# Patient Record
Sex: FEMALE | Race: BLACK OR AFRICAN AMERICAN | Employment: FULL TIME | URBAN - METROPOLITAN AREA
[De-identification: names, ages, dates, MRNs, and addresses within clinical notes are randomized per-mention and may not be internally consistent; named-entity substitution may affect disease eponyms.]

---

## 2017-02-03 ENCOUNTER — TELEPHONE (OUTPATIENT)
Dept: FAMILY MEDICINE CLINIC | Facility: CLINIC | Age: 57
End: 2017-02-03

## 2017-02-03 NOTE — TELEPHONE ENCOUNTER
----- Message from Terrie Godoy sent at 2/3/2017 11:27 AM EST -----  Contact: DR KEYS  PATIENT IS GOING TO BE HAVING SOME PAPER WORK SENT TO YOU TO FILL OUT FOR HER . PATIENT WANTED YOU TO KNOW THAT SHE HAS BEEN AT [facility] AT 3 DIFFERENT TIMES. ALSO PATIENT WANTS TO KNOW IF YOU CAN RENEW THAT PAPER WORK FOR A WHOLE YEAR INSTEAD OF 6 MONTHS?  [phone]

## 2017-02-09 ENCOUNTER — TELEPHONE (OUTPATIENT)
Dept: FAMILY MEDICINE CLINIC | Facility: CLINIC | Age: 57
End: 2017-02-09

## 2017-02-09 NOTE — TELEPHONE ENCOUNTER
I haven't seen it yet. I will need her records from when she was hospitalized and she will need to make an appointment to discuss why she is off from work, documentation purposes. I haven't seen her since Nov 2016. RUDDY

## 2017-02-09 NOTE — TELEPHONE ENCOUNTER
----- Message from Angie Sims sent at 2/9/2017 10:47 AM EST -----  Contact: Kaylyn  Patient is calling to see if leave of absence paperwork has been received. Please call patient [phone].

## 2017-02-09 NOTE — TELEPHONE ENCOUNTER
When I called pt to tell her, she say's, the first message from 2-3-17 should have been to Dr. Heath, not Dr. Kincaid. She will call and have the papers faxed to Dr. Heath and if she needs an appt let her know.  Dr. Heath had completed these papers for her in the past.

## 2017-03-08 DIAGNOSIS — R51.9 ACUTE NONINTRACTABLE HEADACHE, UNSPECIFIED HEADACHE TYPE: ICD-10-CM

## 2017-03-08 DIAGNOSIS — M79.671 PAIN OF RIGHT HEEL: ICD-10-CM

## 2017-03-08 DIAGNOSIS — M79.601 RIGHT ARM PAIN: ICD-10-CM

## 2017-03-10 RX ORDER — NAPROXEN 500 MG/1
TABLET ORAL
Qty: 40 TABLET | Refills: 0 | OUTPATIENT
Start: 2017-03-10

## 2017-03-15 DIAGNOSIS — M79.601 RIGHT ARM PAIN: ICD-10-CM

## 2017-03-15 DIAGNOSIS — R51.9 ACUTE NONINTRACTABLE HEADACHE, UNSPECIFIED HEADACHE TYPE: ICD-10-CM

## 2017-03-15 DIAGNOSIS — M79.671 PAIN OF RIGHT HEEL: ICD-10-CM

## 2017-03-16 NOTE — TELEPHONE ENCOUNTER
Before we can refill this, need to confirm with her which one she is taking < Meloxicam or naproxen. Let pharmacy know what the issue is and we have to wait til we hear form her. bds

## 2017-03-17 RX ORDER — NAPROXEN 500 MG/1
TABLET ORAL
Qty: 40 TABLET | Refills: 0 | Status: SHIPPED | OUTPATIENT
Start: 2017-03-17 | End: 2017-03-24 | Stop reason: SDUPTHER

## 2017-03-24 ENCOUNTER — OFFICE VISIT (OUTPATIENT)
Dept: FAMILY MEDICINE CLINIC | Facility: CLINIC | Age: 57
End: 2017-03-24

## 2017-03-24 VITALS
HEIGHT: 63 IN | HEART RATE: 80 BPM | DIASTOLIC BLOOD PRESSURE: 85 MMHG | TEMPERATURE: 97.8 F | RESPIRATION RATE: 16 BRPM | SYSTOLIC BLOOD PRESSURE: 130 MMHG | BODY MASS INDEX: 32 KG/M2 | WEIGHT: 180.6 LBS

## 2017-03-24 DIAGNOSIS — R73.9 HYPERGLYCEMIA: ICD-10-CM

## 2017-03-24 DIAGNOSIS — M79.601 PARESTHESIA AND PAIN OF BOTH UPPER EXTREMITIES: Primary | ICD-10-CM

## 2017-03-24 DIAGNOSIS — Z13.220 SCREENING FOR HYPERLIPIDEMIA: ICD-10-CM

## 2017-03-24 DIAGNOSIS — R25.2 MUSCLE CRAMPS: ICD-10-CM

## 2017-03-24 DIAGNOSIS — M79.602 PARESTHESIA AND PAIN OF BOTH UPPER EXTREMITIES: Primary | ICD-10-CM

## 2017-03-24 DIAGNOSIS — R20.2 PARESTHESIA AND PAIN OF BOTH UPPER EXTREMITIES: Primary | ICD-10-CM

## 2017-03-24 PROCEDURE — 99213 OFFICE O/P EST LOW 20 MIN: CPT | Performed by: FAMILY MEDICINE

## 2017-03-24 RX ORDER — GABAPENTIN 600 MG/1
600 TABLET ORAL 3 TIMES DAILY
Qty: 90 TABLET | Refills: 2 | Status: SHIPPED | OUTPATIENT
Start: 2017-03-24 | End: 2019-11-01

## 2017-03-24 RX ORDER — NAPROXEN 500 MG/1
500 TABLET ORAL 2 TIMES DAILY WITH MEALS
Qty: 60 TABLET | Refills: 2 | Status: SHIPPED | OUTPATIENT
Start: 2017-03-24 | End: 2017-07-25

## 2017-03-24 RX ORDER — METHOCARBAMOL 500 MG/1
500 TABLET, FILM COATED ORAL 3 TIMES DAILY PRN
Qty: 30 TABLET | Refills: 2 | Status: SHIPPED | OUTPATIENT
Start: 2017-03-24 | End: 2017-07-25

## 2017-03-24 NOTE — PROGRESS NOTES
Subjective   Irris MARIA DE JESUS Tony is a 56 y.o. female.     History of Present Illness   Here for evaluation of bilateral arm numbness and tingling that radiates into her hands  This is a chronic condition and treated with Neurontin. She feels that she needs an increase in the dosage because it doesn't completely resolve symptoms.   She denies that it starts in her neck region, no history of neck injury  Dr. Heath has seen her for this condition. In August, he encouraged her to complete a nerve conduction study to evaluate the symptoms. I asked her about this today, she states that she is scared of needles.   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 While working, her symptoms do worsen at times.   Has weakness in her hands and forearms bilaterally  Symptoms are worse on the right side    Labs are due    The following portions of the patient's history were reviewed and updated as appropriate: allergies, current medications, past family history, past medical history, past social history, past surgical history and problem list.    Review of Systems   Respiratory: Negative for cough and shortness of breath.    Cardiovascular: Negative for chest pain.   Endocrine: Negative for cold intolerance and heat intolerance.   Musculoskeletal: Negative for neck pain and neck stiffness.   Allergic/Immunologic: Negative for environmental allergies.   Neurological: Positive for weakness and numbness. Negative for dizziness and headaches.   Hematological: Negative for adenopathy. Does not bruise/bleed easily.   Psychiatric/Behavioral: Negative for confusion and sleep disturbance.       Objective   Physical Exam   Constitutional: She is oriented to person, place, and time. She appears well-developed and well-nourished.   HENT:   Head: Normocephalic and atraumatic.   Right Ear: External ear normal.   Left Ear: External ear normal.   Nose: Nose normal.   Eyes: Conjunctivae and EOM are normal.   Cardiovascular:  Normal rate, regular rhythm and normal heart sounds.    Pulmonary/Chest: Effort normal and breath sounds normal. No respiratory distress. She has no wheezes.   Musculoskeletal: She exhibits no edema or deformity.   Neurological: She is alert and oriented to person, place, and time. A sensory deficit (can detect normal touch, numbness and tingling of fingertips ) is present. No cranial nerve deficit.   Skin: Skin is warm and dry.   Psychiatric: She has a normal mood and affect. Her behavior is normal.   Nursing note and vitals reviewed.      Assessment/Plan   Drake was seen today for pain & tingling in bilateral arms & hands.    Diagnoses and all orders for this visit:    Paresthesia and pain of both upper extremities  -     gabapentin (NEURONTIN) 600 MG tablet; Take 1 tablet by mouth 3 (Three) Times a Day.  -     naproxen (NAPROSYN) 500 MG tablet; Take 1 tablet by mouth 2 (Two) Times a Day With Meals.  -     EMG & Nerve Conduction Test  -     Comprehensive Metabolic Panel; Future  -     CBC & Differential; Future  -     Vitamin B12; Future    Muscle cramps  -     methocarbamol (ROBAXIN) 500 MG tablet; Take 1 tablet by mouth 3 (Three) Times a Day As Needed for Muscle Spasms.  -     Comprehensive Metabolic Panel; Future  -     CBC & Differential; Future  -     Magnesium; Future    Hyperglycemia  -     Comprehensive Metabolic Panel; Future  -     CBC & Differential; Future  -     Hemoglobin A1c; Future    Screening for hyperlipidemia  -     Comprehensive Metabolic Panel; Future  -     CBC & Differential; Future  -     Lipid Panel; Future      Increased Neurontin to 600mg TID   I have encouraged her to complete nerve conduction study of UE b/l  Instructed her to complete labs soon

## 2017-03-24 NOTE — PATIENT INSTRUCTIONS
Go to the nearest ER or return to clinic if symptoms worsen, fever/chill develop    Neuropathic Pain  Neuropathic pain is pain caused by damage to the nerves that are responsible for certain sensations in your body (sensory nerves). The pain can be caused by damage to:   · The sensory nerves that send signals to your spinal cord and brain (peripheral nervous system).  · The sensory nerves in your brain or spinal cord (central nervous system).  Neuropathic pain can make you more sensitive to pain. What would be a minor sensation for most people may feel very painful if you have neuropathic pain. This is usually a long-term condition that can be difficult to treat. The type of pain can differ from person to person. It may start suddenly (acute), or it may develop slowly and last for a long time (chronic). Neuropathic pain may come and go as damaged nerves heal or may stay at the same level for years. It often causes emotional distress, loss of sleep, and a lower quality of life.  CAUSES   The most common cause of damage to a sensory nerve is diabetes. Many other diseases and conditions can also cause neuropathic pain. Causes of neuropathic pain can be classified as:  · Toxic. Many drugs and chemicals can cause toxic damage. The most common cause of toxic neuropathic pain is damage from drug treatment for cancer (chemotherapy).  · Metabolic. This type of pain can happen when a disease causes imbalances that damage nerves. Diabetes is the most common of these diseases. Vitamin B deficiency caused by long-term alcohol abuse is another common cause.  · Traumatic. Any injury that cuts, crushes, or stretches a nerve can cause damage and pain. A common example is feeling pain after losing an arm or leg (phantom limb pain).  · Compression-related. If a sensory nerve gets trapped or compressed for a long period of time, the blood supply to the nerve can be cut off.  · Vascular. Many blood vessel diseases can cause neuropathic  pain by decreasing blood supply and oxygen to nerves.  · Autoimmune. This type of pain results from diseases in which the body's defense system mistakenly attacks sensory nerves. Examples of autoimmune diseases that can cause neuropathic pain include lupus and multiple sclerosis.  · Infectious. Many types of viral infections can damage sensory nerves and cause pain. Shingles infection is a common cause of this type of pain.  · Inherited. Neuropathic pain can be a symptom of many diseases that are passed down through families (genetic).  SIGNS AND SYMPTOMS   The main symptom is pain. Neuropathic pain is often described as:  · Burning.  · Shock-like.  · Stinging.  · Hot or cold.  · Itching.  DIAGNOSIS   No single test can diagnose neuropathic pain. Your health care provider will do a physical exam and ask you about your pain. You may use a pain scale to describe how bad your pain is. You may also have tests to see if you have a high sensitivity to pain and to help find the cause and location of any sensory nerve damage. These tests may include:  · Imaging studies, such as:    X-rays.    CT scan.    MRI.  · Nerve conduction studies to test how well nerve signals travel through your sensory nerves (electrodiagnostic testing).  · Stimulating your sensory nerves through electrodes on your skin and measuring the response in your spinal cord and brain (somatosensory evoked potentials).  TREATMENT   Treatment for neuropathic pain may change over time. You may need to try different treatment options or a combination of treatments. Some options include:  · Over-the-counter pain relievers.  · Prescription medicines. Some medicines used to treat other conditions may also help neuropathic pain. These include medicines to:  ¨ Control seizures (anticonvulsants).  ¨ Relieve depression (antidepressants).  · Prescription-strength pain relievers (narcotics). These are usually used when other pain relievers do not  help.  · Transcutaneous nerve stimulation (TENS). This uses electrical currents to block painful nerve signals. The treatment is painless.  · Topical and local anesthetics. These are medicines that numb the nerves. They can be injected as a nerve block or applied to the skin.  · Alternative treatments, such as:  ¨ Acupuncture.  ¨ Meditation.  ¨ Massage.  ¨ Physical therapy.  ¨ Pain management programs.  ¨ Counseling.  HOME CARE INSTRUCTIONS  · Learn as much as you can about your condition.  · Take medicines only as directed by your health care provider.  · Work closely with all your health care providers to find what works best for you.  · Have a good support system at home.  · Consider joining a chronic pain support group.  SEEK MEDICAL CARE IF:  · Your pain treatments are not helping.  · You are having side effects from your medicines.  · You are struggling with fatigue, mood changes, depression, or anxiety.     This information is not intended to replace advice given to you by your health care provider. Make sure you discuss any questions you have with your health care provider.     Document Released: 09/14/2005 Document Revised: 01/08/2016 Document Reviewed: 05/28/2015  [employer] Patient Education ©2016 [employer]

## 2017-07-14 ENCOUNTER — TELEPHONE (OUTPATIENT)
Dept: FAMILY MEDICINE CLINIC | Facility: CLINIC | Age: 57
End: 2017-07-14

## 2017-07-14 RX ORDER — FLUCONAZOLE 150 MG/1
TABLET ORAL
Qty: 2 TABLET | Refills: 0 | Status: SHIPPED | OUTPATIENT
Start: 2017-07-14 | End: 2017-07-25

## 2017-07-14 NOTE — TELEPHONE ENCOUNTER
Please call.  I will send in the prescription for Diflucan but if not getting better, will need office visit.

## 2017-07-14 NOTE — TELEPHONE ENCOUNTER
"Spoke with pt who c/o a \"fishy odor\", \"creamy\" vaginal D/C, burning with urination, and pelvic pain x2 days. Denies back pain, fever/chills, being on an ATB recently. Please advise.  "

## 2017-07-25 ENCOUNTER — OFFICE VISIT (OUTPATIENT)
Dept: FAMILY MEDICINE CLINIC | Facility: CLINIC | Age: 57
End: 2017-07-25

## 2017-07-25 VITALS
TEMPERATURE: 98 F | OXYGEN SATURATION: 98 % | DIASTOLIC BLOOD PRESSURE: 88 MMHG | HEART RATE: 110 BPM | RESPIRATION RATE: 16 BRPM | BODY MASS INDEX: 31.45 KG/M2 | SYSTOLIC BLOOD PRESSURE: 126 MMHG | WEIGHT: 177.5 LBS | HEIGHT: 63 IN

## 2017-07-25 DIAGNOSIS — M54.41 ACUTE RIGHT-SIDED LOW BACK PAIN WITH RIGHT-SIDED SCIATICA: Primary | ICD-10-CM

## 2017-07-25 PROCEDURE — 99213 OFFICE O/P EST LOW 20 MIN: CPT | Performed by: FAMILY MEDICINE

## 2017-07-25 RX ORDER — DICLOFENAC SODIUM 75 MG/1
75 TABLET, DELAYED RELEASE ORAL 2 TIMES DAILY
Qty: 30 TABLET | Refills: 1 | Status: SHIPPED | OUTPATIENT
Start: 2017-07-25 | End: 2017-12-11

## 2017-07-25 NOTE — PROGRESS NOTES
"  Chief Complaint   Patient presents with   • Pain     back and hip pain       Subjective     Back Pain   This is a recurrent problem. The current episode started more than 1 year ago. The problem occurs daily. The problem has been gradually worsening (worse x 1 week) since onset. The pain is present in the lumbar spine. The quality of the pain is described as aching. The pain radiates to the left thigh. The pain is moderate. The symptoms are aggravated by standing (walking). Associated symptoms include leg pain and weakness (at times). Pertinent negatives include no bladder incontinence, bowel incontinence, numbness, paresis or tingling. She has tried NSAIDs (naproxen) for the symptoms. The treatment provided no relief.       Schizophrenia  Prior hospitalization for schizophrenia  still see faces and hear voice s and paranoid  Will need new FMLA and due 8/25/2017  Has been hospital 3 times for schizophrenia 2014  [employer] MD leaving. She is going to try to Dr Moore      Past Medical History,Medications, Allergies, and social history was reviewed.    Review of Systems   Gastrointestinal: Negative for bowel incontinence.   Genitourinary: Negative for bladder incontinence.   Musculoskeletal: Positive for back pain.   Neurological: Positive for weakness (at times). Negative for tingling and numbness.       Objective     Vitals:    07/25/17 1233   BP: 126/88   Pulse: 110   Resp: 16   Temp: 98 °F (36.7 °C)   TempSrc: Temporal Artery    SpO2: 98%   Weight: 177 lb 8 oz (80.5 kg)   Height: 63\" (160 cm)        Physical Exam   Constitutional: She is oriented to person, place, and time. She appears well-developed and well-nourished.   HENT:   Head: Normocephalic and atraumatic.   Right Ear: Hearing and external ear normal.   Left Ear: Hearing and external ear normal.   Mouth/Throat: Oropharynx is clear and moist.   Eyes: Conjunctivae and EOM are normal. Pupils are equal, round, and reactive to light.   Cardiovascular: Exam " reveals no friction rub.    No murmur heard.  Pulmonary/Chest: Effort normal.   Musculoskeletal:        Lumbar back: She exhibits decreased range of motion and tenderness.   SLR +/- left leg     Neurological: She is alert and oriented to person, place, and time.   Reflex Scores:       Patellar reflexes are 1+ on the right side and 1+ on the left side.  Skin: Skin is warm.   Psychiatric: She has a normal mood and affect. Her behavior is normal.   Nursing note and vitals reviewed.        Assessment/Plan     Problem List Items Addressed This Visit     None      Visit Diagnoses     Acute right-sided low back pain with right-sided sciatica    -  Primary    Relevant Medications    diclofenac (VOLTAREN) 75 MG EC tablet           Follow up: Return if symptoms worsen or fail to improve.       DISCUSSION  Discuss options for treatment.  I suspect she has a irritated nerve or sciatica.  Considered prednisone but she does have history of schizophrenia with hallucinations that this could make that worse.  Since naproxen not helping completely, we will try Voltaren as noted.  Discussed options including physical therapy as well but she does not think she will have time for that.  We will see how this works and she will call if not improving.    Near the end of August, she will need new [facility] paperwork for schizophrenia.  She will follow-up with psychiatry.  She is trying to get in to see Dr. Moore given that [facility] physician is leaving.  We may need to give her medication if she is not able to get in until she can see a psychiatrist.  Would not want her to go without her medication.    MEDICATIONS PRESCRIBED  Requested Prescriptions     Signed Prescriptions Disp Refills   • diclofenac (VOLTAREN) 75 MG EC tablet 30 tablet 1     Sig: Take 1 tablet by mouth 2 (Two) Times a Day.          Wade Heath MD

## 2017-09-01 ENCOUNTER — TELEPHONE (OUTPATIENT)
Dept: FAMILY MEDICINE CLINIC | Facility: CLINIC | Age: 57
End: 2017-09-01

## 2017-09-01 RX ORDER — FLUCONAZOLE 150 MG/1
TABLET ORAL
Qty: 2 TABLET | Refills: 0 | Status: SHIPPED | OUTPATIENT
Start: 2017-09-01 | End: 2017-12-11

## 2017-09-01 NOTE — TELEPHONE ENCOUNTER
----- Message from Mei Royal sent at 9/1/2017  8:35 AM EDT -----  Contact: butler  PATIENT TRIED A NEW SOAP AND HER YEAST INFECTION HAS COME BACK. ASKING IF YOU COULD PLEASE CALL IN A COUPLE MORE OF THE DIFLUCAN      [place]OMETOWN IN [facility]

## 2017-12-11 ENCOUNTER — OFFICE VISIT (OUTPATIENT)
Dept: FAMILY MEDICINE CLINIC | Facility: CLINIC | Age: 57
End: 2017-12-11

## 2017-12-11 VITALS
WEIGHT: 180 LBS | DIASTOLIC BLOOD PRESSURE: 90 MMHG | SYSTOLIC BLOOD PRESSURE: 126 MMHG | HEIGHT: 63 IN | OXYGEN SATURATION: 99 % | TEMPERATURE: 98 F | RESPIRATION RATE: 14 BRPM | BODY MASS INDEX: 31.89 KG/M2 | HEART RATE: 100 BPM

## 2017-12-11 DIAGNOSIS — F20.0 PARANOID SCHIZOPHRENIA (HCC): Primary | ICD-10-CM

## 2017-12-11 DIAGNOSIS — R20.2 NUMBNESS AND TINGLING IN BOTH HANDS: ICD-10-CM

## 2017-12-11 DIAGNOSIS — R20.0 NUMBNESS AND TINGLING IN BOTH HANDS: ICD-10-CM

## 2017-12-11 PROCEDURE — 99213 OFFICE O/P EST LOW 20 MIN: CPT | Performed by: FAMILY MEDICINE

## 2017-12-11 RX ORDER — NAPROXEN 500 MG/1
500 TABLET ORAL 2 TIMES DAILY WITH MEALS
COMMUNITY
End: 2017-12-11 | Stop reason: SDUPTHER

## 2017-12-11 RX ORDER — NAPROXEN 500 MG/1
500 TABLET ORAL 2 TIMES DAILY WITH MEALS
Qty: 40 TABLET | Refills: 2 | Status: SHIPPED | OUTPATIENT
Start: 2017-12-11 | End: 2018-10-08 | Stop reason: SDUPTHER

## 2017-12-11 NOTE — PROGRESS NOTES
Assessment/Plan     Problem List Items Addressed This Visit        Other    Paranoid schizophrenia - Primary      Other Visit Diagnoses     Numbness and tingling in both hands        Possible CTS    Relevant Medications    naproxen (NAPROSYN) 500 MG tablet           Follow up: Return if symptoms worsen or fail to improve.     DISCUSSION  Paranoid schizophrenia.  Continue medication.  Completed work form accommodation sheet.  She will take to work.  See sheet.    Numbness and tingling.  Possible carpal tunnel.  Try naproxen and if not improving, will need further workup.      MEDICATIONS PRESCRIBED  Requested Prescriptions     Signed Prescriptions Disp Refills   • naproxen (NAPROSYN) 500 MG tablet 40 tablet 2     Sig: Take 1 tablet by mouth 2 (Two) Times a Day With Meals.          -------------------------------------------    Subjective     Chief Complaint   Patient presents with   • paper work needing completed   • Numbness       HPI    Paranoid schizophrenia  Patient with diagnosis of paranoid schizophrenia.  She sees a psychiatrist.  Needs accommodation works form completed for work.  They would not complete this for her.    She reports history of visual and auditory hallucinations.  At times he is getting worse and limits her ability to concentrate and work due to fear and she has to take a break from what she is doing.  Occasionally she has to leave work.  This happens at least 2 or maybe 3 times per day where she has to take a break.  She works about 6 hours per day.  It does not limit her ability to work or do physical labor while she is not having the hallucinations.  It only affects this during the time of hallucinations.    Hand numbness  Naproxen helps  Would like to get a refill of naproxen.  Numbness comes and goes.  Medication does help.        Past Medical History,Medications, Allergies, and social history was reviewed.    Review of Systems   Constitutional: Negative.    HENT: Negative.   "  Respiratory: Negative.    Cardiovascular: Negative.    Gastrointestinal: Negative.    Musculoskeletal: Negative.    Neurological: Positive for numbness.   Psychiatric/Behavioral: Positive for hallucinations. The patient is nervous/anxious.        Objective     Vitals:    12/11/17 1534 12/11/17 1607   BP: 126/90    Pulse: (!) 122 100   Resp: 14    Temp: 98 °F (36.7 °C)    TempSrc: Temporal Artery     SpO2: 99%    Weight: 81.6 kg (180 lb)    Height: 160 cm (62.99\")         Physical Exam   Constitutional: She is oriented to person, place, and time. She appears well-developed and well-nourished.   HENT:   Head: Normocephalic and atraumatic.   Right Ear: Hearing and external ear normal.   Left Ear: Hearing and external ear normal.   Mouth/Throat: Oropharynx is clear and moist.   Eyes: Conjunctivae and EOM are normal. Pupils are equal, round, and reactive to light.   Cardiovascular: Normal rate, regular rhythm and normal heart sounds.    Pulmonary/Chest: Effort normal and breath sounds normal. No respiratory distress. She has no wheezes. She has no rales.   Musculoskeletal: She exhibits no edema.   Neurological: She is alert and oriented to person, place, and time.   Skin: Skin is warm.   Psychiatric: She has a normal mood and affect. Her behavior is normal.   Nursing note and vitals reviewed.              Wade Heath MD    "

## 2018-01-09 ENCOUNTER — TELEPHONE (OUTPATIENT)
Dept: FAMILY MEDICINE CLINIC | Facility: CLINIC | Age: 58
End: 2018-01-09

## 2018-01-09 ENCOUNTER — OFFICE VISIT (OUTPATIENT)
Dept: FAMILY MEDICINE CLINIC | Facility: CLINIC | Age: 58
End: 2018-01-09

## 2018-01-09 VITALS
OXYGEN SATURATION: 100 % | RESPIRATION RATE: 14 BRPM | WEIGHT: 187 LBS | HEART RATE: 80 BPM | SYSTOLIC BLOOD PRESSURE: 124 MMHG | DIASTOLIC BLOOD PRESSURE: 86 MMHG | TEMPERATURE: 98 F | HEIGHT: 63 IN | BODY MASS INDEX: 33.13 KG/M2

## 2018-01-09 DIAGNOSIS — Z20.828 EXPOSURE TO THE FLU: ICD-10-CM

## 2018-01-09 DIAGNOSIS — N76.0 ACUTE VAGINITIS: ICD-10-CM

## 2018-01-09 DIAGNOSIS — F20.0 PARANOID SCHIZOPHRENIA (HCC): ICD-10-CM

## 2018-01-09 DIAGNOSIS — F41.9 ANXIETY: Primary | ICD-10-CM

## 2018-01-09 PROCEDURE — 99213 OFFICE O/P EST LOW 20 MIN: CPT | Performed by: FAMILY MEDICINE

## 2018-01-09 RX ORDER — OSELTAMIVIR PHOSPHATE 75 MG/1
75 CAPSULE ORAL DAILY
Qty: 10 CAPSULE | Refills: 0 | Status: SHIPPED | OUTPATIENT
Start: 2018-01-09 | End: 2018-04-05

## 2018-01-09 RX ORDER — AMITRIPTYLINE HYDROCHLORIDE 25 MG/1
25-50 TABLET, FILM COATED ORAL NIGHTLY
Qty: 30 TABLET | Refills: 1 | Status: SHIPPED | OUTPATIENT
Start: 2018-01-09 | End: 2018-02-05 | Stop reason: SDUPTHER

## 2018-01-09 RX ORDER — FLUCONAZOLE 150 MG/1
TABLET ORAL
Qty: 2 TABLET | Refills: 0 | Status: SHIPPED | OUTPATIENT
Start: 2018-01-09 | End: 2018-04-05

## 2018-01-09 NOTE — TELEPHONE ENCOUNTER
ASAP.  Please call them to verify that she missed these days:      [facility]  fax [phone]      WIN # [phone]    1960    Missed 2018 and 2018

## 2018-01-09 NOTE — PROGRESS NOTES
Assessment/Plan     Problem List Items Addressed This Visit        Other    Paranoid schizophrenia    Relevant Medications    amitriptyline (ELAVIL) 25 MG tablet      Other Visit Diagnoses     Anxiety    -  Primary    Relevant Medications    amitriptyline (ELAVIL) 25 MG tablet    Exposure to the flu        Relevant Medications    oseltamivir (TAMIFLU) 75 MG capsule    Acute vaginitis               Follow up: Return if symptoms worsen or fail to improve.     DISCUSSION  Amoxapine is a tricyclic antidepressant.  We will try amitriptyline 25 mg one at bedtime and may try to if that does not help.    The disability company was notified that she missed those days.    Diflucan for vaginal discharge consistent with yeast infection.  This is helped in the past.    Her  has the flu so we will start 75 mg a Tamiflu daily for 10 days.      MEDICATIONS PRESCRIBED  Requested Prescriptions     Signed Prescriptions Disp Refills   • amitriptyline (ELAVIL) 25 MG tablet 30 tablet 1     Sig: Take 1-2 tablets by mouth Every Night.   • fluconazole (DIFLUCAN) 150 MG tablet 2 tablet 0     Sig: one by mouth today and repeat in 3 days if not better   • oseltamivir (TAMIFLU) 75 MG capsule 10 capsule 0     Sig: Take 1 capsule by mouth Daily.          -------------------------------------------    Subjective     Chief Complaint   Patient presents with   • PAPERWORK   • Vaginitis     discharge white only no other sx.    • Anxiety     the pt has been out of meds for a month on the amoxapine due to they are out of stock or havent made it and will not get any more in until feb 24. pt is feeling crazy in her head and she has called her psych. and they said they couldn't call any thing in and she would need appt. her appt is set for jan 30 and she is wanting to see if you can help her with something until then. please discuss with pt.        HPI    Anxiety  Out of Amoxapine 50 mg 2 po at night  Out of this meds  Not able to get til  "2/24/2018  Sees psy 1/30/2018      Missed   [phone]   1960  Bernadette [id number] [phone] fax [phone]    Missed 1/6/2018 and 1/9/2018    Vaginal d.c  White, thick  No itch  Sl odor  No antibiotic  X 1 week of symptoms  Diflucan helped in the past    + flu in  and needs Tamiflu      Past Medical History,Medications, Allergies, and social history was reviewed.    Review of Systems   Constitutional: Positive for fatigue.   HENT: Negative.    Respiratory: Negative.    Cardiovascular: Negative.  Chest pain: anxiety.   Gastrointestinal: Negative.    Genitourinary: Positive for vaginal discharge. Negative for dysuria, frequency and vaginal bleeding.   Psychiatric/Behavioral: Positive for dysphoric mood, hallucinations and sleep disturbance. The patient is nervous/anxious.        Objective     Vitals:    01/09/18 1634   BP: 124/86   Pulse: 80   Resp: 14   Temp: 98 °F (36.7 °C)   TempSrc: Temporal Artery    SpO2: 100%   Weight: 84.8 kg (187 lb)   Height: 160 cm (62.99\")        Physical Exam   Constitutional: She is oriented to person, place, and time. She appears well-developed and well-nourished.   HENT:   Head: Normocephalic and atraumatic.   Right Ear: Hearing and external ear normal.   Left Ear: Hearing and external ear normal.   Mouth/Throat: Oropharynx is clear and moist.   Eyes: Conjunctivae and EOM are normal. Pupils are equal, round, and reactive to light.   Cardiovascular: Normal rate, regular rhythm and normal heart sounds.  Exam reveals no friction rub.    No murmur heard.  Pulmonary/Chest: Effort normal and breath sounds normal. No respiratory distress. She has no wheezes. She has no rales.   Neurological: She is alert and oriented to person, place, and time.   Skin: Skin is warm.   Psychiatric: Her behavior is normal. Her mood appears anxious.   tearful   Nursing note and vitals reviewed.              Wade Heath MD    "

## 2018-01-11 ENCOUNTER — TELEPHONE (OUTPATIENT)
Dept: FAMILY MEDICINE CLINIC | Facility: CLINIC | Age: 58
End: 2018-01-11

## 2018-01-11 NOTE — TELEPHONE ENCOUNTER
----- Message from Veronique Ribeiro sent at 1/11/2018  3:00 PM EST -----  Contact: PERLA / PT CALL  PATIENT DID NOT GO TO WORK TODAY DUE TO TAKING MEDS AND HAVING BAD DREAMS AND HAVING MENTAL ILLNESS.   PT WANTS DR DUNCAN TO CALL JOSE ROBERTO REGARDING HER ACCOMADATIONS PAPER WORK.  [phone].    PATIENT CALL BACK [phone]

## 2018-01-12 ENCOUNTER — TELEPHONE (OUTPATIENT)
Dept: FAMILY MEDICINE CLINIC | Facility: CLINIC | Age: 58
End: 2018-01-12

## 2018-01-12 NOTE — TELEPHONE ENCOUNTER
Please call, can change to Nortriptyline 25 mg 1-2 po q hs #30. Please send in if she would like to try. BUT need to stop the Amitriptyline(Elavil). bds

## 2018-01-12 NOTE — TELEPHONE ENCOUNTER
SPOKE WITH PT AND SHE IS ASKING IF SHE CAN STOP THE MEDICATIONS AS SHE IS SEEING SHADOWS WITH IT. CAN YOU GIVE SOMETHING ELSE. PLEASE CALL PT.

## 2018-01-15 NOTE — TELEPHONE ENCOUNTER
PT IS GOING TO STICK WITH WHAT SHE ALREADY IS TAKING AS SHE HASN'T SEEN ANY SHADOWS WITH IT LAST NIGHT OR TWO. SHE IS GOING TO TRY AGAIN TONIGHT AND MAKE SURE AND WILL CALL AND LET US KNOW.    FYI DON'T NEED BACK

## 2018-02-05 DIAGNOSIS — F41.9 ANXIETY: ICD-10-CM

## 2018-02-05 DIAGNOSIS — F20.0 PARANOID SCHIZOPHRENIA (HCC): ICD-10-CM

## 2018-02-05 RX ORDER — AMITRIPTYLINE HYDROCHLORIDE 25 MG/1
TABLET, FILM COATED ORAL
Qty: 30 TABLET | Refills: 0 | Status: SHIPPED | OUTPATIENT
Start: 2018-02-05 | End: 2018-02-06 | Stop reason: SDUPTHER

## 2018-02-06 DIAGNOSIS — F20.0 PARANOID SCHIZOPHRENIA (HCC): ICD-10-CM

## 2018-02-06 DIAGNOSIS — F41.9 ANXIETY: ICD-10-CM

## 2018-02-06 RX ORDER — AMITRIPTYLINE HYDROCHLORIDE 25 MG/1
TABLET, FILM COATED ORAL
Qty: 30 TABLET | Refills: 0 | Status: SHIPPED | OUTPATIENT
Start: 2018-02-06 | End: 2018-03-17 | Stop reason: SDUPTHER

## 2018-03-01 ENCOUNTER — RESULTS ENCOUNTER (OUTPATIENT)
Dept: FAMILY MEDICINE CLINIC | Facility: CLINIC | Age: 58
End: 2018-03-01

## 2018-03-01 DIAGNOSIS — Z13.220 SCREENING FOR HYPERLIPIDEMIA: ICD-10-CM

## 2018-03-01 DIAGNOSIS — M79.602 PARESTHESIA AND PAIN OF BOTH UPPER EXTREMITIES: ICD-10-CM

## 2018-03-01 DIAGNOSIS — R73.9 HYPERGLYCEMIA: ICD-10-CM

## 2018-03-01 DIAGNOSIS — R25.2 MUSCLE CRAMPS: ICD-10-CM

## 2018-03-01 DIAGNOSIS — R20.2 PARESTHESIA AND PAIN OF BOTH UPPER EXTREMITIES: ICD-10-CM

## 2018-03-01 DIAGNOSIS — M79.601 PARESTHESIA AND PAIN OF BOTH UPPER EXTREMITIES: ICD-10-CM

## 2018-03-17 DIAGNOSIS — F20.0 PARANOID SCHIZOPHRENIA (HCC): ICD-10-CM

## 2018-03-17 DIAGNOSIS — F41.9 ANXIETY: ICD-10-CM

## 2018-03-19 DIAGNOSIS — F20.0 PARANOID SCHIZOPHRENIA (HCC): ICD-10-CM

## 2018-03-19 DIAGNOSIS — F41.9 ANXIETY: ICD-10-CM

## 2018-03-19 RX ORDER — AMITRIPTYLINE HYDROCHLORIDE 25 MG/1
TABLET, FILM COATED ORAL
Qty: 30 TABLET | Refills: 0 | Status: SHIPPED | OUTPATIENT
Start: 2018-03-19 | End: 2018-03-19 | Stop reason: SDUPTHER

## 2018-03-19 RX ORDER — AMITRIPTYLINE HYDROCHLORIDE 25 MG/1
TABLET, FILM COATED ORAL
Qty: 30 TABLET | Refills: 0 | Status: SHIPPED | OUTPATIENT
Start: 2018-03-19 | End: 2018-05-22 | Stop reason: SDUPTHER

## 2018-03-21 ENCOUNTER — TELEPHONE (OUTPATIENT)
Dept: FAMILY MEDICINE CLINIC | Facility: CLINIC | Age: 58
End: 2018-03-21

## 2018-04-02 ENCOUNTER — TELEPHONE (OUTPATIENT)
Dept: FAMILY MEDICINE CLINIC | Facility: CLINIC | Age: 58
End: 2018-04-02

## 2018-04-02 NOTE — TELEPHONE ENCOUNTER
----- Message from Angie Sims sent at 4/2/2018  8:06 AM EDT -----  Contact: Heath  Patient stated she is back to work, she is needing a paper faxed to her work to let them know she is off of her medical leave. Please call patient with any questions at [phone]. Fax number: [phone].

## 2018-04-02 NOTE — TELEPHONE ENCOUNTER
Please call, more info, not able to call right now. Need to know what days she missed and what day did she go back to work.   Need specific dates.

## 2018-04-02 NOTE — TELEPHONE ENCOUNTER
PLease call, I thought she had just missed a few days. Given that she missed that many days, rec office visit to go over and document everything.Can use a same day appt on 4/3/2018 if needed. yash

## 2018-04-02 NOTE — TELEPHONE ENCOUNTER
SPOKE WITH PT AND SHE MISSED MARCH 15,16,17,21,22,23,27,28 AND RTW ON 3/33/18   PT WOULD LIKE TO SPEAK WITH YOU AS YOU UNDERSTAND HER BETTER WITH SCHIZOPHRENIA AND WOULD NOT ELABORATE OF WHAT WAS GOING ON. INFORMED HER THAT DR DUNCAN WAS SEEING PT AND HE MAY CALL LATER. PT VERBALIZED UNDERSTANDING.

## 2018-04-02 NOTE — TELEPHONE ENCOUNTER
Please call patient.  More information.  I thought the FMLA just allows her period of time off and then she can return without needing a note.  She should not need a note from us since the [place] paper was completed.  Please clarify.

## 2018-04-02 NOTE — TELEPHONE ENCOUNTER
SPOKE WITH PT AND INFORMED HER OF MESSAGE AND PT VERBALIZED UNDERSTANDING. PT WAS TRANSFERRED UP FRONT.

## 2018-04-02 NOTE — TELEPHONE ENCOUNTER
Patient states she would like to talk to Dr. Heath regarding forms that she is having faxed over. Please call patient at [phone].

## 2018-04-02 NOTE — TELEPHONE ENCOUNTER
Spoke w/ pt and she is having forms faxed over later today for you to complete where she missed a few days of work earlier. She said, this is unrelated to the FMLA forms.

## 2018-04-02 NOTE — TELEPHONE ENCOUNTER
----- Message from Beatrice Rubio sent at 4/2/2018  8:55 AM EDT -----  Contact: PERLA; PT CALL BACK   PT IS OUT ON MEDICATION LEAVE AND SHE WOULD LIKE SOMEONE TO CALL HER BACK ABOUT HER FMLA.     CALL BACK     [phone]

## 2018-04-05 ENCOUNTER — OFFICE VISIT (OUTPATIENT)
Dept: FAMILY MEDICINE CLINIC | Facility: CLINIC | Age: 58
End: 2018-04-05

## 2018-04-05 VITALS
SYSTOLIC BLOOD PRESSURE: 138 MMHG | WEIGHT: 181 LBS | HEIGHT: 63 IN | HEART RATE: 72 BPM | BODY MASS INDEX: 32.07 KG/M2 | RESPIRATION RATE: 16 BRPM | TEMPERATURE: 97.4 F | DIASTOLIC BLOOD PRESSURE: 84 MMHG

## 2018-04-05 DIAGNOSIS — F20.0 PARANOID SCHIZOPHRENIA (HCC): Primary | ICD-10-CM

## 2018-04-05 PROCEDURE — 99213 OFFICE O/P EST LOW 20 MIN: CPT | Performed by: FAMILY MEDICINE

## 2018-04-05 NOTE — PROGRESS NOTES
"  Assessment/Plan       Problems Addressed this Visit        Other    Paranoid schizophrenia - Primary      Other Visit Diagnoses    None.           Follow up: Return if symptoms worsen or fail to improve.     DISCUSSION  Worsening schizophrenia with paranoia and hallucinations.  [facility] paperwork was completed.  See copy.  Recommend she follow-up with her psychiatrist.  She has an appointment on 4/14/2018.  Continue current medications at this time.  May take 2 Elavil to help sleep if needed.      MEDICATIONS PRESCRIBED  Requested Prescriptions      No prescriptions requested or ordered in this encounter          -------------------------------------------    Subjective     Chief Complaint   Patient presents with   • paperwork         History of Present Illness    Schizophrenia  + paranoid  + hallucinations (visual), sees faces in wall  Hears ugly words (cuss words)  Told her to run naked in the street in 2014    Decreased sleep, only talking one elavil    Sees comp care for her meds  Goes back this month 4/18/2018 2/19 2/26 2/27    Continuous  3/15  3/16  3/17  3/21  3/23    RTW 3/31/2018      Past Medical History,Medications, Allergies, and social history was reviewed.      Review of Systems   Constitutional: Negative.    HENT: Negative.    Respiratory: Negative.    Cardiovascular: Negative.    Psychiatric/Behavioral:        Hallucinations       Objective     Vitals:    04/05/18 1600   BP: 138/84   Pulse: 72   Resp: 16   Temp: 97.4 °F (36.3 °C)   Weight: 82.1 kg (181 lb)   Height: 160 cm (63\")          Physical Exam   Constitutional: She is oriented to person, place, and time. She appears well-developed and well-nourished.   HENT:   Head: Normocephalic and atraumatic.   Right Ear: Hearing and external ear normal.   Left Ear: Hearing and external ear normal.   Mouth/Throat: Oropharynx is clear and moist.   Eyes: Conjunctivae and EOM are normal. Pupils are equal, round, and reactive to light. "   Cardiovascular: Normal rate, regular rhythm and normal heart sounds.  Exam reveals no friction rub.    No murmur heard.  Pulmonary/Chest: Effort normal and breath sounds normal. No respiratory distress. She has no wheezes. She has no rales.   Neurological: She is alert and oriented to person, place, and time.   Skin: Skin is warm.   Psychiatric: Her behavior is normal. Her mood appears anxious.   Nursing note and vitals reviewed.              Wade Heath MD

## 2018-04-13 ENCOUNTER — TELEPHONE (OUTPATIENT)
Dept: FAMILY MEDICINE CLINIC | Facility: CLINIC | Age: 58
End: 2018-04-13

## 2018-04-13 NOTE — TELEPHONE ENCOUNTER
----- Message from Mei Royal sent at 4/13/2018 12:44 PM EDT -----  Contact: DUNCAN  PATIENT CALLED TO SAY THERE ARE SOME FORMS BEING FAXED TO YOU FROM [facility] AND SHE NEEDS THESE DATES ADDED.   FEB 19TH. MARCH 15TH, 16TH, 17TH, 21-23.

## 2018-04-23 NOTE — TELEPHONE ENCOUNTER
Spoke w/ pt and informed her that no forms had been sent over and she said, they are not going to do that now.

## 2018-05-09 DIAGNOSIS — F20.0 PARANOID SCHIZOPHRENIA (HCC): ICD-10-CM

## 2018-05-09 DIAGNOSIS — F41.9 ANXIETY: ICD-10-CM

## 2018-05-09 RX ORDER — AMITRIPTYLINE HYDROCHLORIDE 25 MG/1
TABLET, FILM COATED ORAL
Qty: 30 TABLET | Refills: 0 | Status: SHIPPED | OUTPATIENT
Start: 2018-05-09 | End: 2018-05-22 | Stop reason: SDUPTHER

## 2018-05-22 DIAGNOSIS — F20.0 PARANOID SCHIZOPHRENIA (HCC): ICD-10-CM

## 2018-05-22 DIAGNOSIS — F41.9 ANXIETY: ICD-10-CM

## 2018-05-22 RX ORDER — AMITRIPTYLINE HYDROCHLORIDE 25 MG/1
TABLET, FILM COATED ORAL
Qty: 30 TABLET | Refills: 0 | Status: SHIPPED | OUTPATIENT
Start: 2018-05-22 | End: 2018-06-14 | Stop reason: SDUPTHER

## 2018-06-04 DIAGNOSIS — F20.0 PARANOID SCHIZOPHRENIA (HCC): ICD-10-CM

## 2018-06-04 DIAGNOSIS — F41.9 ANXIETY: ICD-10-CM

## 2018-06-04 RX ORDER — AMITRIPTYLINE HYDROCHLORIDE 25 MG/1
TABLET, FILM COATED ORAL
Qty: 30 TABLET | Refills: 0 | Status: SHIPPED | OUTPATIENT
Start: 2018-06-04 | End: 2018-06-14 | Stop reason: SDUPTHER

## 2018-06-14 DIAGNOSIS — F20.0 PARANOID SCHIZOPHRENIA (HCC): ICD-10-CM

## 2018-06-14 DIAGNOSIS — F41.9 ANXIETY: ICD-10-CM

## 2018-06-14 RX ORDER — AMITRIPTYLINE HYDROCHLORIDE 25 MG/1
TABLET, FILM COATED ORAL
Qty: 30 TABLET | Refills: 0 | Status: SHIPPED | OUTPATIENT
Start: 2018-06-14 | End: 2018-06-29 | Stop reason: SDUPTHER

## 2018-06-29 DIAGNOSIS — F41.9 ANXIETY: ICD-10-CM

## 2018-06-29 DIAGNOSIS — F20.0 PARANOID SCHIZOPHRENIA (HCC): ICD-10-CM

## 2018-06-29 RX ORDER — AMITRIPTYLINE HYDROCHLORIDE 25 MG/1
TABLET, FILM COATED ORAL
Qty: 30 TABLET | Refills: 0 | Status: SHIPPED | OUTPATIENT
Start: 2018-06-29 | End: 2018-07-25 | Stop reason: SDUPTHER

## 2018-07-12 DIAGNOSIS — F41.9 ANXIETY: ICD-10-CM

## 2018-07-12 DIAGNOSIS — F20.0 PARANOID SCHIZOPHRENIA (HCC): ICD-10-CM

## 2018-07-12 RX ORDER — AMITRIPTYLINE HYDROCHLORIDE 25 MG/1
TABLET, FILM COATED ORAL
Qty: 30 TABLET | Refills: 0 | Status: SHIPPED | OUTPATIENT
Start: 2018-07-12 | End: 2018-07-25 | Stop reason: SDUPTHER

## 2018-07-25 DIAGNOSIS — F20.0 PARANOID SCHIZOPHRENIA (HCC): ICD-10-CM

## 2018-07-25 DIAGNOSIS — F41.9 ANXIETY: ICD-10-CM

## 2018-07-25 RX ORDER — AMITRIPTYLINE HYDROCHLORIDE 25 MG/1
TABLET, FILM COATED ORAL
Qty: 30 TABLET | Refills: 0 | Status: SHIPPED | OUTPATIENT
Start: 2018-07-25 | End: 2018-07-26 | Stop reason: SDUPTHER

## 2018-07-26 ENCOUNTER — OFFICE VISIT (OUTPATIENT)
Dept: FAMILY MEDICINE CLINIC | Facility: CLINIC | Age: 58
End: 2018-07-26

## 2018-07-26 VITALS
DIASTOLIC BLOOD PRESSURE: 98 MMHG | RESPIRATION RATE: 20 BRPM | WEIGHT: 185 LBS | SYSTOLIC BLOOD PRESSURE: 122 MMHG | HEART RATE: 100 BPM | BODY MASS INDEX: 32.78 KG/M2 | HEIGHT: 63 IN | TEMPERATURE: 97.9 F

## 2018-07-26 DIAGNOSIS — F20.0 PARANOID SCHIZOPHRENIA (HCC): Primary | ICD-10-CM

## 2018-07-26 DIAGNOSIS — M23.201 OLD TEAR OF LATERAL MENISCUS OF LEFT KNEE, UNSPECIFIED TEAR TYPE: ICD-10-CM

## 2018-07-26 DIAGNOSIS — F41.9 ANXIETY: ICD-10-CM

## 2018-07-26 DIAGNOSIS — F20.0 PARANOID SCHIZOPHRENIA (HCC): ICD-10-CM

## 2018-07-26 PROCEDURE — 99213 OFFICE O/P EST LOW 20 MIN: CPT | Performed by: FAMILY MEDICINE

## 2018-07-26 RX ORDER — AMITRIPTYLINE HYDROCHLORIDE 50 MG/1
50 TABLET, FILM COATED ORAL
Qty: 30 TABLET | Refills: 5 | Status: SHIPPED | OUTPATIENT
Start: 2018-07-26 | End: 2018-07-26 | Stop reason: SDUPTHER

## 2018-07-26 RX ORDER — AMITRIPTYLINE HYDROCHLORIDE 25 MG/1
TABLET, FILM COATED ORAL
Qty: 30 TABLET | Refills: 2 | Status: SHIPPED | OUTPATIENT
Start: 2018-07-26 | End: 2018-08-06 | Stop reason: SDUPTHER

## 2018-07-26 NOTE — PROGRESS NOTES
Assessment/Plan       Problems Addressed this Visit        Other    Paranoid schizophrenia (CMS/[facility]) - Primary    Relevant Medications    amitriptyline (ELAVIL) 50 MG tablet      Other Visit Diagnoses     Anxiety        Relevant Medications    amitriptyline (ELAVIL) 50 MG tablet    Old tear of lateral meniscus of left knee, unspecified tear type                Follow up: Return in about 3 months (around 10/26/2018), or if symptoms worsen or fail to improve.     DISCUSSION  Paranoid schizophrenia and anxiety.  Overall stable.  Continue current medications.  FMLA paperwork and Accommodation form was completed.  Elavil seems to help.  Continue 50 mg at bedtime.    Left knee pain.  Like handicap sticker.  History of meniscal tear.  She refuses surgery due to fear.      MEDICATIONS PRESCRIBED  Requested Prescriptions     Signed Prescriptions Disp Refills   • amitriptyline (ELAVIL) 50 MG tablet 30 tablet 5     Sig: Take 1 tablet by mouth every night at bedtime.          -------------------------------------------    Subjective     Chief Complaint   Patient presents with   • Knee Pain     L side   • FMLA paperwork     She needs this filled out         History of Present Illness    Schizophrenia  stable  + auditory and visual hallucinations still  Otherwise doing okay.  Anxiety seems to be improved with the Elavil.  However she has take two 25 mg pills to equal 50 mg.  She would like a new prescription for this.    Needs FMLA and accomodation form    Needs extra 10 min breaks if gets scared or has Hallucination while at work    Left knee pain.  History of meniscal tear.  Afraid to have surgery done.  Would like to have handicap sticker.      History   Smoking Status   • Never Smoker   Smokeless Tobacco   • Never Used        Past Medical History,Medications, Allergies, and social history was reviewed.      Review of Systems   Constitutional: Positive for fatigue.   HENT: Negative.    Respiratory: Negative.   "  Cardiovascular: Negative.    Gastrointestinal: Negative.    Musculoskeletal: Positive for arthralgias.   Psychiatric/Behavioral: Positive for decreased concentration and hallucinations. The patient is nervous/anxious.        Objective     Vitals:    07/26/18 1149   BP: 122/98   Pulse: 100   Resp: 20   Temp: 97.9 °F (36.6 °C)   Weight: 83.9 kg (185 lb)   Height: 160 cm (63\")          Physical Exam   Constitutional: She is oriented to person, place, and time. She appears well-developed and well-nourished.   HENT:   Head: Normocephalic and atraumatic.   Right Ear: Hearing and external ear normal.   Left Ear: Hearing normal.   Eyes: Pupils are equal, round, and reactive to light. Conjunctivae and EOM are normal.   Cardiovascular: Normal rate, regular rhythm and normal heart sounds.  Exam reveals no friction rub.    No murmur heard.  Pulmonary/Chest: Effort normal and breath sounds normal. No respiratory distress. She has no wheezes. She has no rales.   Musculoskeletal:        Left knee: She exhibits decreased range of motion. She exhibits no swelling. Tenderness (medial) found.   Neurological: She is alert and oriented to person, place, and time.   Skin: Skin is warm.   Psychiatric: Her speech is normal and behavior is normal. Her mood appears anxious.   Nursing note and vitals reviewed.                Wade Heath MD    "

## 2018-08-06 DIAGNOSIS — F20.0 PARANOID SCHIZOPHRENIA (HCC): ICD-10-CM

## 2018-08-06 DIAGNOSIS — F41.9 ANXIETY: ICD-10-CM

## 2018-08-06 RX ORDER — AMITRIPTYLINE HYDROCHLORIDE 25 MG/1
TABLET, FILM COATED ORAL
Qty: 30 TABLET | Refills: 0 | Status: SHIPPED | OUTPATIENT
Start: 2018-08-06 | End: 2018-08-08 | Stop reason: SDUPTHER

## 2018-08-07 DIAGNOSIS — F41.9 ANXIETY: ICD-10-CM

## 2018-08-07 DIAGNOSIS — F20.0 PARANOID SCHIZOPHRENIA (HCC): ICD-10-CM

## 2018-08-07 RX ORDER — AMITRIPTYLINE HYDROCHLORIDE 25 MG/1
TABLET, FILM COATED ORAL
Qty: 30 TABLET | Refills: 1 | Status: SHIPPED | OUTPATIENT
Start: 2018-08-07 | End: 2018-08-08 | Stop reason: SDUPTHER

## 2018-08-08 DIAGNOSIS — F41.9 ANXIETY: ICD-10-CM

## 2018-08-08 DIAGNOSIS — F20.0 PARANOID SCHIZOPHRENIA (HCC): ICD-10-CM

## 2018-08-08 RX ORDER — AMITRIPTYLINE HYDROCHLORIDE 25 MG/1
TABLET, FILM COATED ORAL
Qty: 30 TABLET | Refills: 2 | Status: SHIPPED | OUTPATIENT
Start: 2018-08-08 | End: 2019-01-10 | Stop reason: SDUPTHER

## 2018-08-31 ENCOUNTER — OFFICE VISIT (OUTPATIENT)
Dept: FAMILY MEDICINE CLINIC | Facility: CLINIC | Age: 58
End: 2018-08-31

## 2018-08-31 VITALS
TEMPERATURE: 97.7 F | HEIGHT: 63 IN | SYSTOLIC BLOOD PRESSURE: 130 MMHG | WEIGHT: 184 LBS | BODY MASS INDEX: 32.6 KG/M2 | HEART RATE: 88 BPM | DIASTOLIC BLOOD PRESSURE: 90 MMHG | RESPIRATION RATE: 16 BRPM

## 2018-08-31 DIAGNOSIS — M25.462 PAIN AND SWELLING OF LEFT KNEE: ICD-10-CM

## 2018-08-31 DIAGNOSIS — S89.92XA INJURY OF LEFT KNEE, INITIAL ENCOUNTER: Primary | ICD-10-CM

## 2018-08-31 DIAGNOSIS — M25.462 EFFUSION OF LEFT KNEE: ICD-10-CM

## 2018-08-31 DIAGNOSIS — M25.562 PAIN AND SWELLING OF LEFT KNEE: ICD-10-CM

## 2018-08-31 PROCEDURE — 99213 OFFICE O/P EST LOW 20 MIN: CPT | Performed by: FAMILY MEDICINE

## 2018-08-31 RX ORDER — NABUMETONE 500 MG/1
500 TABLET, FILM COATED ORAL 2 TIMES DAILY PRN
Qty: 40 TABLET | Refills: 1 | Status: SHIPPED | OUTPATIENT
Start: 2018-08-31 | End: 2018-10-08

## 2018-09-12 ENCOUNTER — TELEPHONE (OUTPATIENT)
Dept: FAMILY MEDICINE CLINIC | Facility: CLINIC | Age: 58
End: 2018-09-12

## 2018-09-12 NOTE — TELEPHONE ENCOUNTER
----- Message from Mei Guerrier sent at 9/12/2018  4:24 PM EDT -----  Contact: PERLA;PT CALLED  PT HAD A MRI TODAY AND SHE STATES SHE HAD LEFT A [place] FORM TO  BE COMPLETED AND IT NEEDS TO BE FAXED BY Monday    [phone]    PT AWARE DR IS OUT

## 2018-09-13 NOTE — TELEPHONE ENCOUNTER
Please call, I have not seen in forms. I filled one out last week and she was to  because I think it needed to be signed by her.     Also, see result note on the MRI    Wade Heath MD

## 2018-09-17 DIAGNOSIS — S83.207A ACUTE MENISCAL TEAR OF LEFT KNEE, INITIAL ENCOUNTER: Primary | ICD-10-CM

## 2018-09-17 NOTE — TELEPHONE ENCOUNTER
Patient stated that she didn't know that she needed to sign the paperwork but that she will try to come in before we close today or tomorrow to get that done so that it can be faxed. Verbalized a good understanding, no further questions.

## 2018-09-17 NOTE — TELEPHONE ENCOUNTER
I HAVE PAPER WORK IN MY TRAY AS I HAVE NOT BEEN ABLE TO REACH PT. ATTEMPTED SEVERAL OCCASIONS AND NO VM. TODAY IS THE SAME NO VM

## 2018-09-26 ENCOUNTER — TELEPHONE (OUTPATIENT)
Dept: FAMILY MEDICINE CLINIC | Facility: CLINIC | Age: 58
End: 2018-09-26

## 2018-10-08 ENCOUNTER — TELEPHONE (OUTPATIENT)
Dept: FAMILY MEDICINE CLINIC | Facility: CLINIC | Age: 58
End: 2018-10-08

## 2018-10-08 DIAGNOSIS — R20.2 NUMBNESS AND TINGLING IN BOTH HANDS: ICD-10-CM

## 2018-10-08 DIAGNOSIS — R20.0 NUMBNESS AND TINGLING IN BOTH HANDS: ICD-10-CM

## 2018-10-08 RX ORDER — NAPROXEN 500 MG/1
500 TABLET ORAL 2 TIMES DAILY WITH MEALS
Qty: 40 TABLET | Refills: 2 | Status: SHIPPED | OUTPATIENT
Start: 2018-10-08 | End: 2019-11-01 | Stop reason: SDUPTHER

## 2018-10-08 NOTE — TELEPHONE ENCOUNTER
----- Message from Beatrice Rubio sent at 10/8/2018  4:57 PM EDT -----  Contact: fran cotton   Pt would like to have naproxen called in for her. The other medication taht was called in for her she does not like. Naproxen helps her better.       Pharmacy   [facility]       Call back   [phone]

## 2018-10-31 ENCOUNTER — TELEPHONE (OUTPATIENT)
Dept: FAMILY MEDICINE CLINIC | Facility: CLINIC | Age: 58
End: 2018-10-31

## 2018-11-01 NOTE — TELEPHONE ENCOUNTER
Please call.  Swelling does not always mean an infection.  She would need to be evaluated if she thinks she has a knee infection.  Did she see orthopedics for the tear in her knee?  Did she have any surgery?  If yes, she may need to call orthopedics to evaluate knee swelling if not, she will need to come in here.

## 2018-11-07 NOTE — TELEPHONE ENCOUNTER
SPOKE WITH PT AND INFORMED HER OF MESSAGE AND PT HASN'T SEEN ORTHO AND NO SURGERY. PT WILL CALL WHEN SHE CAN SCHEDULE APPT.

## 2019-01-10 DIAGNOSIS — F41.9 ANXIETY: ICD-10-CM

## 2019-01-10 DIAGNOSIS — F20.0 PARANOID SCHIZOPHRENIA (HCC): ICD-10-CM

## 2019-01-11 DIAGNOSIS — F41.9 ANXIETY: ICD-10-CM

## 2019-01-11 DIAGNOSIS — F20.0 PARANOID SCHIZOPHRENIA (HCC): ICD-10-CM

## 2019-01-11 RX ORDER — AMITRIPTYLINE HYDROCHLORIDE 25 MG/1
TABLET, FILM COATED ORAL
Qty: 30 TABLET | Refills: 0 | OUTPATIENT
Start: 2019-01-11

## 2019-01-11 RX ORDER — AMITRIPTYLINE HYDROCHLORIDE 25 MG/1
TABLET, FILM COATED ORAL
Qty: 30 TABLET | Refills: 0 | Status: SHIPPED | OUTPATIENT
Start: 2019-01-11 | End: 2019-06-03

## 2019-01-12 DIAGNOSIS — F41.9 ANXIETY: ICD-10-CM

## 2019-01-12 DIAGNOSIS — F20.0 PARANOID SCHIZOPHRENIA (HCC): ICD-10-CM

## 2019-01-14 RX ORDER — AMITRIPTYLINE HYDROCHLORIDE 25 MG/1
TABLET, FILM COATED ORAL
Qty: 30 TABLET | Refills: 0 | Status: SHIPPED | OUTPATIENT
Start: 2019-01-14 | End: 2019-06-03 | Stop reason: SDUPTHER

## 2019-01-18 ENCOUNTER — TELEPHONE (OUTPATIENT)
Dept: FAMILY MEDICINE CLINIC | Facility: CLINIC | Age: 59
End: 2019-01-18

## 2019-01-18 NOTE — TELEPHONE ENCOUNTER
----- Message from Veronique Bolden Rep sent at 1/18/2019  1:26 PM EST -----  Contact: PT; DUNCAN  PATIENT GOT FIRED FROM HER JOB AND SHE NEEDS A WRITTEN STATEMENT STATING THAT PATIENT CANT WORK ANYTHING BUT PART TIME DUE TO HER DISABILITY.     NEEDS TO BE SENT TO:  ATTN: CLARA   FAX : [phone]

## 2019-01-18 NOTE — TELEPHONE ENCOUNTER
THIS IS NOT DUE TO KNEE ISSUES PATIENT CALLED BACK TO SEE IF THE LETTER HAD BEEN SENT SO I WENT AHEAD AND ASK HER.

## 2019-01-30 ENCOUNTER — TELEPHONE (OUTPATIENT)
Dept: FAMILY MEDICINE CLINIC | Facility: CLINIC | Age: 59
End: 2019-01-30

## 2019-01-30 RX ORDER — FLUCONAZOLE 200 MG/1
TABLET ORAL
Qty: 2 TABLET | Refills: 0 | Status: SHIPPED | OUTPATIENT
Start: 2019-01-30 | End: 2019-07-23 | Stop reason: SDUPTHER

## 2019-01-30 NOTE — TELEPHONE ENCOUNTER
----- Message from Veronique Bolden sent at 1/30/2019 12:34 PM EST -----  Contact: PT; PERLA  PT IS AWARE DR DUNCAN IS OUT OF THE OFFICE TODAY    PATIENT IS NEEDING DIFLUCAN CALLED INTO [facility] HERE IN [place].    PT PHONE: [phone]

## 2019-02-14 ENCOUNTER — TELEPHONE (OUTPATIENT)
Dept: FAMILY MEDICINE CLINIC | Facility: CLINIC | Age: 59
End: 2019-02-14

## 2019-02-14 RX ORDER — NITROFURANTOIN 25; 75 MG/1; MG/1
100 CAPSULE ORAL 2 TIMES DAILY
Qty: 14 CAPSULE | Refills: 0 | Status: SHIPPED | OUTPATIENT
Start: 2019-02-14 | End: 2019-11-01

## 2019-02-14 NOTE — TELEPHONE ENCOUNTER
----- Message from Terrie Godoy sent at 2/14/2019 12:27 PM EST -----  Contact: DR DUNCAN  PATIENT WANTS TO KNOW IF YOU WILL SEND IN MEDICATION FOR A UTI TO [facility]. HER SYMPTOMS ARE DARK YELLOW URINE WITH AND STRONG SMELL. NO OTHER SYMPTOMS THEN THAT.  [phone]

## 2019-02-14 NOTE — TELEPHONE ENCOUNTER
Please call.  Let her know that I sent in Macrobid 100 mg twice a day to [facility].  Office visit if not getting better.

## 2019-03-26 ENCOUNTER — TELEPHONE (OUTPATIENT)
Dept: FAMILY MEDICINE CLINIC | Facility: CLINIC | Age: 59
End: 2019-03-26

## 2019-03-26 NOTE — TELEPHONE ENCOUNTER
Please call, she would need to be seen for this. Rec office visit to check or UTC if not able to get in here.

## 2019-03-26 NOTE — TELEPHONE ENCOUNTER
"Spoke with patient, she was very short/nontalkative . Stated she had a \"little infection on her arm\".  right lower arm, for approx 3 days, there is Pus per patient, not big about the size of a dime or smaller   "

## 2019-03-26 NOTE — TELEPHONE ENCOUNTER
----- Message from Veronique Bolden Rep sent at 3/26/2019 12:16 PM EDT -----  Contact: PT; PERLA  PATIENT WANTS TO KNOW CAN DR KEYS CALL HER IN SOME AMOXICILLIN 250 MG.    [facility]    PT: [phone]

## 2019-06-03 ENCOUNTER — TELEPHONE (OUTPATIENT)
Dept: FAMILY MEDICINE CLINIC | Facility: CLINIC | Age: 59
End: 2019-06-03

## 2019-06-03 DIAGNOSIS — F41.9 ANXIETY: ICD-10-CM

## 2019-06-03 DIAGNOSIS — F20.0 PARANOID SCHIZOPHRENIA (HCC): ICD-10-CM

## 2019-06-03 RX ORDER — AMITRIPTYLINE HYDROCHLORIDE 25 MG/1
TABLET, FILM COATED ORAL
Qty: 30 TABLET | Refills: 2 | Status: SHIPPED | OUTPATIENT
Start: 2019-06-03 | End: 2019-09-28 | Stop reason: SDUPTHER

## 2019-06-03 NOTE — TELEPHONE ENCOUNTER
Please call.  I sent in her amitriptyline and she is due for a follow-up office visit.  We have not seen her since August 2018

## 2019-06-04 DIAGNOSIS — F41.9 ANXIETY: ICD-10-CM

## 2019-06-04 DIAGNOSIS — F20.0 PARANOID SCHIZOPHRENIA (HCC): ICD-10-CM

## 2019-06-04 RX ORDER — AMITRIPTYLINE HYDROCHLORIDE 25 MG/1
TABLET, FILM COATED ORAL
Qty: 30 TABLET | Refills: 0 | OUTPATIENT
Start: 2019-06-04

## 2019-06-06 DIAGNOSIS — F41.9 ANXIETY: ICD-10-CM

## 2019-06-06 DIAGNOSIS — F20.0 PARANOID SCHIZOPHRENIA (HCC): ICD-10-CM

## 2019-06-06 RX ORDER — AMITRIPTYLINE HYDROCHLORIDE 25 MG/1
TABLET, FILM COATED ORAL
Qty: 30 TABLET | Refills: 0 | OUTPATIENT
Start: 2019-06-06

## 2019-07-23 ENCOUNTER — TELEPHONE (OUTPATIENT)
Dept: FAMILY MEDICINE CLINIC | Facility: CLINIC | Age: 59
End: 2019-07-23

## 2019-07-23 RX ORDER — FLUCONAZOLE 150 MG/1
TABLET ORAL
Qty: 2 TABLET | Refills: 0 | Status: SHIPPED | OUTPATIENT
Start: 2019-07-23 | End: 2019-11-01

## 2019-07-23 RX ORDER — FLUCONAZOLE 150 MG/1
TABLET ORAL
Qty: 2 TABLET | Refills: 0 | OUTPATIENT
Start: 2019-07-23

## 2019-07-23 NOTE — TELEPHONE ENCOUNTER
----- Message from Veronique Ribeiro sent at 7/23/2019 11:53 AM EDT -----  Contact: PERLA / PT CALL  PT CALLED AND WOULD LIKE CHAS CALLED IN FOR A YEAST INFECTION - PLEASE CALL    PT CALL BACK [phone]    [facility]

## 2019-09-04 ENCOUNTER — TELEPHONE (OUTPATIENT)
Dept: FAMILY MEDICINE CLINIC | Facility: CLINIC | Age: 59
End: 2019-09-04

## 2019-09-04 NOTE — TELEPHONE ENCOUNTER
Please call, please confirm directions and dosage of med and she needs office visit. Not seen in 1 yr.

## 2019-09-04 NOTE — TELEPHONE ENCOUNTER
----- Message from Beatrice Rubio sent at 9/4/2019  3:23 PM EDT -----  Contact: PERLA; MED REFILL   PT CALLED IN WANTING TO KNOW IF YOU WOULD CALL HER IN OLANZAPINE TO THE PHARMACY FOR HER.       [facility]      [facility] - [address] - [phone]  - [phone]  [phone] (Phone)  [phone] (Fax)          CALL BACK   [phone]

## 2019-09-05 NOTE — TELEPHONE ENCOUNTER
Please call again.  The med list list that she is on Navane 10 mg.  Is she taking this as well?  Please see if she can give you a list of all of her current medications.  Again, she needs to make an appointment and I can send in a few of the olanzapine if there is nothing is going to interact with it.

## 2019-09-06 RX ORDER — OLANZAPINE 10 MG/1
10 TABLET ORAL NIGHTLY
Qty: 30 TABLET | Refills: 0 | Status: SHIPPED | OUTPATIENT
Start: 2019-09-06 | End: 2019-09-28 | Stop reason: SDUPTHER

## 2019-09-06 NOTE — TELEPHONE ENCOUNTER
Spoke with patient she stated she is not taking the navane anymore. Has not taken in a while. Stated she only has 1 of the olanzapine left. And stated she would call Monday to schedule an appointment.

## 2019-09-28 ENCOUNTER — TELEPHONE (OUTPATIENT)
Dept: FAMILY MEDICINE CLINIC | Facility: CLINIC | Age: 59
End: 2019-09-28

## 2019-09-28 DIAGNOSIS — F20.0 PARANOID SCHIZOPHRENIA (HCC): ICD-10-CM

## 2019-09-28 DIAGNOSIS — F41.9 ANXIETY: ICD-10-CM

## 2019-09-30 DIAGNOSIS — F41.9 ANXIETY: ICD-10-CM

## 2019-09-30 DIAGNOSIS — F20.0 PARANOID SCHIZOPHRENIA (HCC): ICD-10-CM

## 2019-09-30 RX ORDER — AMITRIPTYLINE HYDROCHLORIDE 25 MG/1
TABLET, FILM COATED ORAL
Qty: 30 TABLET | Refills: 0 | OUTPATIENT
Start: 2019-09-30

## 2019-09-30 RX ORDER — OLANZAPINE 10 MG/1
TABLET ORAL
Qty: 30 TABLET | Refills: 0 | Status: SHIPPED | OUTPATIENT
Start: 2019-09-30 | End: 2019-10-26 | Stop reason: SDUPTHER

## 2019-09-30 RX ORDER — AMITRIPTYLINE HYDROCHLORIDE 25 MG/1
TABLET, FILM COATED ORAL
Qty: 30 TABLET | Refills: 0 | Status: SHIPPED | OUTPATIENT
Start: 2019-09-30 | End: 2019-10-12 | Stop reason: SDUPTHER

## 2019-09-30 NOTE — TELEPHONE ENCOUNTER
Tried to reach pt, no answer, say's, enter remote access code.  Mailed red reminder card to pt to sched a fu appt.

## 2019-09-30 NOTE — TELEPHONE ENCOUNTER
Please call patient.  I have refilled the medications as requested, amitriptyline and olanzapine but she must make a follow-up appointment.  I have not seen her in 1 year.

## 2019-10-01 DIAGNOSIS — F41.9 ANXIETY: ICD-10-CM

## 2019-10-01 DIAGNOSIS — F20.0 PARANOID SCHIZOPHRENIA (HCC): ICD-10-CM

## 2019-10-01 RX ORDER — AMITRIPTYLINE HYDROCHLORIDE 25 MG/1
TABLET, FILM COATED ORAL
Qty: 30 TABLET | Refills: 0 | OUTPATIENT
Start: 2019-10-01

## 2019-10-04 RX ORDER — OLANZAPINE 10 MG/1
TABLET ORAL
Qty: 30 TABLET | Refills: 0 | OUTPATIENT
Start: 2019-10-04

## 2019-10-08 NOTE — TELEPHONE ENCOUNTER
Please call pharmacy.  We refilled the olanzapine on September 30 but we are now getting another refill request.  Please confirm.

## 2019-10-09 NOTE — TELEPHONE ENCOUNTER
Pharmacy confirmed the refill on 9-30-19. But it is on auto refill and no refills was included on 9-30-19.

## 2019-10-10 RX ORDER — OLANZAPINE 10 MG/1
TABLET ORAL
Qty: 30 TABLET | Refills: 0 | OUTPATIENT
Start: 2019-10-10

## 2019-10-12 DIAGNOSIS — F41.9 ANXIETY: ICD-10-CM

## 2019-10-12 DIAGNOSIS — F20.0 PARANOID SCHIZOPHRENIA (HCC): ICD-10-CM

## 2019-10-14 DIAGNOSIS — F20.0 PARANOID SCHIZOPHRENIA (HCC): ICD-10-CM

## 2019-10-14 DIAGNOSIS — F41.9 ANXIETY: ICD-10-CM

## 2019-10-14 RX ORDER — AMITRIPTYLINE HYDROCHLORIDE 25 MG/1
TABLET, FILM COATED ORAL
Qty: 30 TABLET | Refills: 1 | Status: SHIPPED | OUTPATIENT
Start: 2019-10-14 | End: 2019-11-01 | Stop reason: SDUPTHER

## 2019-10-15 RX ORDER — AMITRIPTYLINE HYDROCHLORIDE 25 MG/1
TABLET, FILM COATED ORAL
Qty: 30 TABLET | Refills: 0 | OUTPATIENT
Start: 2019-10-15

## 2019-10-28 RX ORDER — OLANZAPINE 10 MG/1
TABLET ORAL
Qty: 30 TABLET | Refills: 0 | Status: SHIPPED | OUTPATIENT
Start: 2019-10-28 | End: 2019-10-29 | Stop reason: SDUPTHER

## 2019-10-28 NOTE — TELEPHONE ENCOUNTER
DR DUNCAN PT    NEEDS SOMEONE TO CALL IN TODAY SHE IS OUT.    BENZTROPINE  OLANZAPINE    [facility]    PT WANTS A CALL ONCE SENT TO PHARMACY.

## 2019-10-28 NOTE — TELEPHONE ENCOUNTER
Patient has not been seen since January and one of these medications you have never prescribed/ historical provider. Are you comfortable with filling? YULISSA

## 2019-10-28 NOTE — TELEPHONE ENCOUNTER
Please call, we have not prescribed the benztropine. I will give 1 week supply of each but may not get sent in til tomorrow. Needs to keep appt on 11/1 as scheduled.

## 2019-10-29 RX ORDER — OLANZAPINE 10 MG/1
TABLET ORAL
Qty: 30 TABLET | Refills: 0 | OUTPATIENT
Start: 2019-10-29

## 2019-10-29 RX ORDER — OLANZAPINE 10 MG/1
10 TABLET ORAL
Qty: 7 TABLET | Refills: 0 | Status: SHIPPED | OUTPATIENT
Start: 2019-10-29 | End: 2019-11-01 | Stop reason: SDUPTHER

## 2019-10-29 RX ORDER — BENZTROPINE MESYLATE 1 MG/1
1 TABLET ORAL 2 TIMES DAILY
Qty: 14 TABLET | Refills: 0 | Status: SHIPPED | OUTPATIENT
Start: 2019-10-29 | End: 2019-11-01 | Stop reason: SDUPTHER

## 2019-10-29 NOTE — TELEPHONE ENCOUNTER
Called and spoke to pt. Pt stated she believes she takes 1 tablet Benzatropine 1mg PO BID and she states she takes 1 tablet Olanzaprine 10mg PO daily at HS. States she will keep her appt for 11/1/19

## 2019-11-01 ENCOUNTER — OFFICE VISIT (OUTPATIENT)
Dept: FAMILY MEDICINE CLINIC | Facility: CLINIC | Age: 59
End: 2019-11-01

## 2019-11-01 VITALS
DIASTOLIC BLOOD PRESSURE: 92 MMHG | WEIGHT: 164 LBS | HEIGHT: 63 IN | SYSTOLIC BLOOD PRESSURE: 136 MMHG | RESPIRATION RATE: 18 BRPM | BODY MASS INDEX: 29.06 KG/M2 | TEMPERATURE: 97.5 F | HEART RATE: 80 BPM

## 2019-11-01 DIAGNOSIS — F20.0 PARANOID SCHIZOPHRENIA (HCC): Primary | ICD-10-CM

## 2019-11-01 DIAGNOSIS — F41.9 ANXIETY: ICD-10-CM

## 2019-11-01 DIAGNOSIS — M23.252 DEGENERATIVE TEAR OF POSTERIOR HORN OF LATERAL MENISCUS, LEFT: ICD-10-CM

## 2019-11-01 PROCEDURE — 99214 OFFICE O/P EST MOD 30 MIN: CPT | Performed by: FAMILY MEDICINE

## 2019-11-01 RX ORDER — BENZTROPINE MESYLATE 1 MG/1
1 TABLET ORAL 2 TIMES DAILY
Qty: 60 TABLET | Refills: 2 | Status: SHIPPED | OUTPATIENT
Start: 2019-11-01 | End: 2020-01-28

## 2019-11-01 RX ORDER — AMITRIPTYLINE HYDROCHLORIDE 25 MG/1
25-50 TABLET, FILM COATED ORAL
Qty: 60 TABLET | Refills: 2 | Status: SHIPPED | OUTPATIENT
Start: 2019-11-01 | End: 2019-11-11

## 2019-11-01 RX ORDER — OLANZAPINE 10 MG/1
10 TABLET ORAL
Qty: 30 TABLET | Refills: 2 | Status: SHIPPED | OUTPATIENT
Start: 2019-11-01 | End: 2019-12-03

## 2019-11-01 RX ORDER — NAPROXEN 500 MG/1
500 TABLET ORAL 2 TIMES DAILY WITH MEALS
Qty: 40 TABLET | Refills: 2 | Status: SHIPPED | OUTPATIENT
Start: 2019-11-01 | End: 2019-12-23

## 2019-11-01 NOTE — PROGRESS NOTES
Assessment/Plan       Problems Addressed this Visit        Musculoskeletal and Integument    Degenerative tear of posterior horn of lateral meniscus, left    Relevant Medications    naproxen (NAPROSYN) 500 MG tablet       Other    Paranoid schizophrenia (CMS/HCC) - Primary    Relevant Medications    amitriptyline (ELAVIL) 25 MG tablet    benztropine (COGENTIN) 1 MG tablet    OLANZapine (zyPREXA) 10 MG tablet    Anxiety    Relevant Medications    amitriptyline (ELAVIL) 25 MG tablet            Follow up: Return in about 3 months (around 2/1/2020), or if symptoms worsen or fail to improve.     DISCUSSION  Paranoid schizophrenia.  Stable.  Continue current medications and follow-up with [facility] when appointment made.    Chronic tear of the lateral meniscus on the left.  Continue naproxen.    Anxiety.  Continue Elavil.          MEDICATIONS PRESCRIBED  Requested Prescriptions     Signed Prescriptions Disp Refills   • amitriptyline (ELAVIL) 25 MG tablet 60 tablet 2     Sig: Take 1-2 tablets by mouth every night at bedtime.   • benztropine (COGENTIN) 1 MG tablet 60 tablet 2     Sig: Take 1 tablet by mouth 2 (Two) Times a Day.   • OLANZapine (zyPREXA) 10 MG tablet 30 tablet 2     Sig: Take 1 tablet by mouth every night at bedtime.   • naproxen (NAPROSYN) 500 MG tablet 40 tablet 2     Sig: Take 1 tablet by mouth 2 (Two) Times a Day With Meals.            -------------------------------------------    Subjective     Chief Complaint   Patient presents with   • Paranoid schizophrenia     F/U         History of Present Illness    Schizophrenia and  anxiety  Not able to work now  + hallucinations: shadows mainly, black and white  Hears voices at times: does not recall what they said and not scary    Denies depressed mood  No suicidal ideation    No hospital since 2018, July and then Nov 2018    Current meds  olanzapine  benztropine and  Elavil    Meds helping   No side effects    Without benztropine, + jerk and twitch    To get  "in to see [facility] care soon  Has to wait and call 11/13      Left knee pain   + poster tear meniscus  hurting again  No surg  Naproxen helps                Social History     Tobacco Use   Smoking Status Never Smoker   Smokeless Tobacco Never Used          Past Medical History,Medications, Allergies, and social history was reviewed.               Review of Systems   Constitutional: Positive for fatigue.   HENT: Negative.    Respiratory: Negative.    Cardiovascular: Negative.    Gastrointestinal: Negative.    Musculoskeletal: Positive for arthralgias.   Psychiatric/Behavioral: Positive for hallucinations and depressed mood. Negative for suicidal ideas. The patient is nervous/anxious.        Objective     Vitals:    11/01/19 1111   BP: 136/92   Pulse: 80   Resp: 18   Temp: 97.5 °F (36.4 °C)   Weight: 74.4 kg (164 lb)   Height: 160 cm (63\")          Physical Exam   Constitutional: She is oriented to person, place, and time. She appears well-developed and well-nourished.   HENT:   Head: Normocephalic and atraumatic.   Right Ear: Hearing and external ear normal.   Left Ear: Hearing and external ear normal.   Mouth/Throat: Oropharynx is clear and moist.   Eyes: Conjunctivae and EOM are normal. Pupils are equal, round, and reactive to light.   Cardiovascular: Normal rate, regular rhythm and normal heart sounds. Exam reveals no friction rub.   No murmur heard.  Pulmonary/Chest: Effort normal and breath sounds normal. No respiratory distress. She has no wheezes. She has no rales.   Neurological: She is alert and oriented to person, place, and time.   Skin: Skin is warm.   Psychiatric: She has a normal mood and affect. Her behavior is normal.   Nursing note and vitals reviewed.    Decreased range of motion of the left knee.  Diffuse tenderness.  Negative Lachman's.  Pain with range of motion.          Wade Heath MD    "

## 2019-11-11 DIAGNOSIS — F20.0 PARANOID SCHIZOPHRENIA (HCC): ICD-10-CM

## 2019-11-11 DIAGNOSIS — F41.9 ANXIETY: ICD-10-CM

## 2019-11-11 RX ORDER — AMITRIPTYLINE HYDROCHLORIDE 25 MG/1
TABLET, FILM COATED ORAL
Qty: 60 TABLET | Refills: 2 | Status: SHIPPED | OUTPATIENT
Start: 2019-11-11 | End: 2020-01-31

## 2019-11-12 DIAGNOSIS — F41.9 ANXIETY: ICD-10-CM

## 2019-11-12 DIAGNOSIS — F20.0 PARANOID SCHIZOPHRENIA (HCC): ICD-10-CM

## 2019-11-12 RX ORDER — AMITRIPTYLINE HYDROCHLORIDE 25 MG/1
TABLET, FILM COATED ORAL
Qty: 30 TABLET | Refills: 0 | OUTPATIENT
Start: 2019-11-12

## 2019-11-27 RX ORDER — OLANZAPINE 10 MG/1
TABLET ORAL
Qty: 30 TABLET | Refills: 0 | OUTPATIENT
Start: 2019-11-27

## 2019-12-03 RX ORDER — OLANZAPINE 10 MG/1
TABLET ORAL
Qty: 30 TABLET | Refills: 2 | Status: SHIPPED | OUTPATIENT
Start: 2019-12-03 | End: 2020-06-11

## 2019-12-23 DIAGNOSIS — M23.252 DEGENERATIVE TEAR OF POSTERIOR HORN OF LATERAL MENISCUS, LEFT: ICD-10-CM

## 2019-12-23 RX ORDER — NAPROXEN 500 MG/1
TABLET ORAL
Qty: 40 TABLET | Refills: 0 | Status: SHIPPED | OUTPATIENT
Start: 2019-12-23 | End: 2020-02-11 | Stop reason: SDUPTHER

## 2020-01-27 DIAGNOSIS — F20.0 PARANOID SCHIZOPHRENIA (HCC): ICD-10-CM

## 2020-01-28 RX ORDER — BENZTROPINE MESYLATE 1 MG/1
TABLET ORAL
Qty: 60 TABLET | Refills: 1 | Status: SHIPPED | OUTPATIENT
Start: 2020-01-28 | End: 2020-02-11 | Stop reason: SDUPTHER

## 2020-01-29 DIAGNOSIS — F20.0 PARANOID SCHIZOPHRENIA (HCC): ICD-10-CM

## 2020-01-30 DIAGNOSIS — F20.0 PARANOID SCHIZOPHRENIA (HCC): ICD-10-CM

## 2020-01-30 DIAGNOSIS — F41.9 ANXIETY: ICD-10-CM

## 2020-01-31 RX ORDER — BENZTROPINE MESYLATE 1 MG/1
TABLET ORAL
Qty: 60 TABLET | Refills: 0 | OUTPATIENT
Start: 2020-01-31

## 2020-01-31 RX ORDER — AMITRIPTYLINE HYDROCHLORIDE 25 MG/1
TABLET, FILM COATED ORAL
Qty: 60 TABLET | Refills: 0 | Status: SHIPPED | OUTPATIENT
Start: 2020-01-31 | End: 2020-02-03

## 2020-02-01 DIAGNOSIS — F41.9 ANXIETY: ICD-10-CM

## 2020-02-01 DIAGNOSIS — F20.0 PARANOID SCHIZOPHRENIA (HCC): ICD-10-CM

## 2020-02-03 RX ORDER — AMITRIPTYLINE HYDROCHLORIDE 25 MG/1
TABLET, FILM COATED ORAL
Qty: 60 TABLET | Refills: 1 | Status: SHIPPED | OUTPATIENT
Start: 2020-02-03 | End: 2020-05-27

## 2020-02-11 ENCOUNTER — TELEPHONE (OUTPATIENT)
Dept: FAMILY MEDICINE CLINIC | Facility: CLINIC | Age: 60
End: 2020-02-11

## 2020-02-11 DIAGNOSIS — F20.0 PARANOID SCHIZOPHRENIA (HCC): ICD-10-CM

## 2020-02-11 DIAGNOSIS — M23.252 DEGENERATIVE TEAR OF POSTERIOR HORN OF LATERAL MENISCUS, LEFT: ICD-10-CM

## 2020-02-11 RX ORDER — BENZTROPINE MESYLATE 1 MG/1
1 TABLET ORAL 2 TIMES DAILY
Qty: 60 TABLET | Refills: 1 | Status: SHIPPED | OUTPATIENT
Start: 2020-02-11 | End: 2020-04-03 | Stop reason: SDUPTHER

## 2020-02-11 RX ORDER — NAPROXEN 500 MG/1
500 TABLET ORAL 2 TIMES DAILY WITH MEALS
Qty: 40 TABLET | Refills: 1 | Status: SHIPPED | OUTPATIENT
Start: 2020-02-11 | End: 2020-04-02

## 2020-02-11 NOTE — TELEPHONE ENCOUNTER
Please call, requested meds sent to pharmacy.     Please let her know that she will be due for follow-up in April.

## 2020-02-11 NOTE — TELEPHONE ENCOUNTER
Pt called and stated she needs a refill for naproxen (NAPROSYN) 500 MG tablet and benztropine (COGENTIN) 1 MG tablet. Please send to [facility] in [place]. Please advise.

## 2020-03-04 ENCOUNTER — TELEPHONE (OUTPATIENT)
Dept: FAMILY MEDICINE CLINIC | Facility: CLINIC | Age: 60
End: 2020-03-04

## 2020-03-04 DIAGNOSIS — B37.9 YEAST INFECTION: Primary | ICD-10-CM

## 2020-03-04 RX ORDER — FLUCONAZOLE 150 MG/1
150 TABLET ORAL ONCE
Qty: 1 TABLET | Refills: 0 | Status: SHIPPED | OUTPATIENT
Start: 2020-03-04 | End: 2020-03-04

## 2020-03-04 NOTE — TELEPHONE ENCOUNTER
PT CALLING TO SEE IF SOME DIFLUCAN CAN BE SENT IN TO HER PHARMACY ON FILE? PLEASE CALL PT TO ADVISE.

## 2020-03-04 NOTE — TELEPHONE ENCOUNTER
Patient stated she is having an odor, itchy, for 2 weeks or more. [facility]. Does not want to Wait.

## 2020-04-02 DIAGNOSIS — M23.252 DEGENERATIVE TEAR OF POSTERIOR HORN OF LATERAL MENISCUS, LEFT: ICD-10-CM

## 2020-04-02 RX ORDER — NAPROXEN 500 MG/1
TABLET ORAL
Qty: 40 TABLET | Refills: 0 | Status: SHIPPED | OUTPATIENT
Start: 2020-04-02 | End: 2020-04-15

## 2020-04-03 ENCOUNTER — TELEPHONE (OUTPATIENT)
Dept: FAMILY MEDICINE CLINIC | Facility: CLINIC | Age: 60
End: 2020-04-03

## 2020-04-03 DIAGNOSIS — F20.0 PARANOID SCHIZOPHRENIA (HCC): ICD-10-CM

## 2020-04-03 RX ORDER — BENZTROPINE MESYLATE 1 MG/1
1 TABLET ORAL 2 TIMES DAILY
Qty: 60 TABLET | Refills: 1 | Status: SHIPPED | OUTPATIENT
Start: 2020-04-03 | End: 2020-05-18

## 2020-04-03 NOTE — TELEPHONE ENCOUNTER
PT CALLING IN TO REQUEST REFILL ON benztropine (COGENTIN) 1 MG tablets.     [facility] - [place], [place] - [address] [place] [address] DRIVE - [phone]      PT IS REQUESTING A CALL TO LET HER  KNOW IF THIS WILL BE SENT IN TODAY BEFORE THE WEEKEND.      PLEASE CALL PT [phone]

## 2020-04-06 DIAGNOSIS — F20.0 PARANOID SCHIZOPHRENIA (HCC): ICD-10-CM

## 2020-04-06 RX ORDER — BENZTROPINE MESYLATE 1 MG/1
TABLET ORAL
Qty: 60 TABLET | Refills: 0 | OUTPATIENT
Start: 2020-04-06

## 2020-04-08 DIAGNOSIS — F20.0 PARANOID SCHIZOPHRENIA (HCC): ICD-10-CM

## 2020-04-08 RX ORDER — BENZTROPINE MESYLATE 1 MG/1
TABLET ORAL
Qty: 60 TABLET | Refills: 0 | OUTPATIENT
Start: 2020-04-08

## 2020-04-15 ENCOUNTER — OFFICE VISIT (OUTPATIENT)
Dept: FAMILY MEDICINE CLINIC | Facility: CLINIC | Age: 60
End: 2020-04-15

## 2020-04-15 VITALS
HEART RATE: 74 BPM | DIASTOLIC BLOOD PRESSURE: 90 MMHG | RESPIRATION RATE: 18 BRPM | BODY MASS INDEX: 33.31 KG/M2 | WEIGHT: 188 LBS | TEMPERATURE: 97.8 F | SYSTOLIC BLOOD PRESSURE: 154 MMHG | HEIGHT: 63 IN

## 2020-04-15 DIAGNOSIS — M79.662 PAIN AND SWELLING OF LEFT LOWER LEG: ICD-10-CM

## 2020-04-15 DIAGNOSIS — M79.89 PAIN AND SWELLING OF LEFT LOWER LEG: ICD-10-CM

## 2020-04-15 DIAGNOSIS — M23.252 DEGENERATIVE TEAR OF POSTERIOR HORN OF LATERAL MENISCUS, LEFT: Primary | ICD-10-CM

## 2020-04-15 DIAGNOSIS — G89.29 CHRONIC PAIN OF LEFT KNEE: ICD-10-CM

## 2020-04-15 DIAGNOSIS — R26.81 GAIT INSTABILITY: ICD-10-CM

## 2020-04-15 DIAGNOSIS — M25.562 CHRONIC PAIN OF LEFT KNEE: ICD-10-CM

## 2020-04-15 PROCEDURE — 99214 OFFICE O/P EST MOD 30 MIN: CPT | Performed by: FAMILY MEDICINE

## 2020-04-15 RX ORDER — MELOXICAM 7.5 MG/1
TABLET ORAL
COMMUNITY
Start: 2020-04-07 | End: 2020-04-15

## 2020-04-15 RX ORDER — ETODOLAC 400 MG/1
400 TABLET, FILM COATED ORAL 2 TIMES DAILY
Qty: 40 TABLET | Refills: 1 | Status: SHIPPED | OUTPATIENT
Start: 2020-04-15 | End: 2020-05-19

## 2020-04-15 NOTE — PROGRESS NOTES
Subjective   Irris MARIA DE JESUS Tony is a 59 y.o. female.     History of Present Illness     For the last year she has had intermittent swelling in a sock like distribution from left lower shin to the foot and ankle  She fell last week and went to the [facility] ER and had a normal Xr done  mobic did not help    The knee and the shin does hurt  No swellling in the toe  There is pain when it swells  aching pain that does not radiate    She has an MRI from 9/2018 with meniscal tear that was never fixed    Naproxen does help the knee pain a little    The following portions of the patient's history were reviewed and updated as appropriate: allergies, current medications, past family history, past medical history, past social history, past surgical history and problem list.    Review of Systems   Constitutional: Negative.  Negative for fever.   HENT: Negative.    Eyes: Negative.    Respiratory: Negative.  Negative for shortness of breath.    Cardiovascular: Negative.  Negative for chest pain.   Gastrointestinal: Negative.    Musculoskeletal: Negative.         Hpi   Skin: Negative.  Negative for rash.   Neurological: Negative.    Psychiatric/Behavioral: Negative.    All other systems reviewed and are negative.      Objective   Physical Exam   Constitutional: She appears well-developed and well-nourished. No distress.   Cardiovascular: Normal rate, regular rhythm and normal heart sounds.   Pulmonary/Chest: Effort normal and breath sounds normal.   Musculoskeletal: She exhibits edema (+1 left shin edema, ).        Legs:  Antalgic gait with left limp   Psychiatric: She has a normal mood and affect. Her behavior is normal.   Nursing note and vitals reviewed.      Assessment/Plan   Drake was seen today for leg pain.    Diagnoses and all orders for this visit:    Degenerative tear of posterior horn of lateral meniscus, left  -     etodolac (LODINE) 400 MG tablet; Take 1 tablet by mouth 2 (Two) Times a Day.    Chronic pain of left knee    Gait  instability    Pain and swelling of left lower leg    there is swelling of the left knee and mild edema, worse than right.  No calf pain and no calf swelling.  Will use compression stockings and lodine PRN pain.  May need to see ortho if pain is persisting.  She will call back if worse.    Reviewed old MRI showing meniscal tar, she simply does not want this to be corrected at this time and feels her recent fall made everything worse.  Unfortunaetly, may need ortho to help if pain persists.  No signs for DVT noted    Ok for quad cane to help with ambulation.  She feels her regular cane does not give her enough support.    Scripts written for cane and compression stockings

## 2020-05-18 DIAGNOSIS — F20.0 PARANOID SCHIZOPHRENIA (HCC): ICD-10-CM

## 2020-05-18 RX ORDER — BENZTROPINE MESYLATE 1 MG/1
TABLET ORAL
Qty: 60 TABLET | Refills: 0 | Status: SHIPPED | OUTPATIENT
Start: 2020-05-18 | End: 2020-06-15

## 2020-05-19 DIAGNOSIS — M23.252 DEGENERATIVE TEAR OF POSTERIOR HORN OF LATERAL MENISCUS, LEFT: ICD-10-CM

## 2020-05-19 RX ORDER — ETODOLAC 400 MG/1
TABLET, FILM COATED ORAL
Qty: 40 TABLET | Refills: 0 | Status: SHIPPED | OUTPATIENT
Start: 2020-05-19

## 2020-05-27 DIAGNOSIS — F41.9 ANXIETY: ICD-10-CM

## 2020-05-27 DIAGNOSIS — F20.0 PARANOID SCHIZOPHRENIA (HCC): ICD-10-CM

## 2020-05-27 RX ORDER — AMITRIPTYLINE HYDROCHLORIDE 25 MG/1
TABLET, FILM COATED ORAL
Qty: 60 TABLET | Refills: 0 | Status: SHIPPED | OUTPATIENT
Start: 2020-05-27 | End: 2020-06-23

## 2020-06-11 RX ORDER — OLANZAPINE 10 MG/1
10 TABLET ORAL
Qty: 30 TABLET | Refills: 0 | Status: SHIPPED | OUTPATIENT
Start: 2020-06-11 | End: 2020-07-08

## 2020-06-15 DIAGNOSIS — F20.0 PARANOID SCHIZOPHRENIA (HCC): ICD-10-CM

## 2020-06-15 RX ORDER — BENZTROPINE MESYLATE 1 MG/1
TABLET ORAL
Qty: 60 TABLET | Refills: 0 | Status: SHIPPED | OUTPATIENT
Start: 2020-06-15 | End: 2020-07-13

## 2020-06-23 DIAGNOSIS — F41.9 ANXIETY: ICD-10-CM

## 2020-06-23 DIAGNOSIS — F20.0 PARANOID SCHIZOPHRENIA (HCC): ICD-10-CM

## 2020-06-23 RX ORDER — AMITRIPTYLINE HYDROCHLORIDE 25 MG/1
TABLET, FILM COATED ORAL
Qty: 60 TABLET | Refills: 2 | Status: SHIPPED | OUTPATIENT
Start: 2020-06-23 | End: 2020-09-14

## 2020-07-08 ENCOUNTER — TELEPHONE (OUTPATIENT)
Dept: FAMILY MEDICINE CLINIC | Facility: CLINIC | Age: 60
End: 2020-07-08

## 2020-07-08 RX ORDER — OLANZAPINE 10 MG/1
TABLET ORAL
Qty: 30 TABLET | Refills: 0 | Status: SHIPPED | OUTPATIENT
Start: 2020-07-08 | End: 2020-08-05

## 2020-07-13 DIAGNOSIS — F20.0 PARANOID SCHIZOPHRENIA (HCC): ICD-10-CM

## 2020-07-13 RX ORDER — BENZTROPINE MESYLATE 1 MG/1
TABLET ORAL
Qty: 60 TABLET | Refills: 0 | Status: SHIPPED | OUTPATIENT
Start: 2020-07-13 | End: 2020-08-10

## 2020-08-05 ENCOUNTER — TELEPHONE (OUTPATIENT)
Dept: FAMILY MEDICINE CLINIC | Facility: CLINIC | Age: 60
End: 2020-08-05

## 2020-08-05 RX ORDER — OLANZAPINE 10 MG/1
TABLET ORAL
Qty: 30 TABLET | Refills: 0 | Status: SHIPPED | OUTPATIENT
Start: 2020-08-05 | End: 2020-08-10

## 2020-08-10 DIAGNOSIS — F20.0 PARANOID SCHIZOPHRENIA (HCC): ICD-10-CM

## 2020-08-10 RX ORDER — BENZTROPINE MESYLATE 1 MG/1
TABLET ORAL
Qty: 14 TABLET | Refills: 0 | Status: SHIPPED | OUTPATIENT
Start: 2020-08-10

## 2020-08-10 RX ORDER — OLANZAPINE 10 MG/1
TABLET ORAL
Qty: 7 TABLET | Refills: 0 | Status: SHIPPED | OUTPATIENT
Start: 2020-08-10

## 2020-09-14 DIAGNOSIS — F20.0 PARANOID SCHIZOPHRENIA (HCC): ICD-10-CM

## 2020-09-14 DIAGNOSIS — F41.9 ANXIETY: ICD-10-CM

## 2020-09-14 RX ORDER — AMITRIPTYLINE HYDROCHLORIDE 25 MG/1
TABLET, FILM COATED ORAL
Qty: 60 TABLET | Refills: 0 | Status: SHIPPED | OUTPATIENT
Start: 2020-09-14 | End: 2021-02-22

## 2021-02-20 ENCOUNTER — TELEPHONE (OUTPATIENT)
Dept: FAMILY MEDICINE CLINIC | Facility: CLINIC | Age: 61
End: 2021-02-20

## 2021-02-20 DIAGNOSIS — F20.0 PARANOID SCHIZOPHRENIA (HCC): ICD-10-CM

## 2021-02-20 DIAGNOSIS — F41.9 ANXIETY: ICD-10-CM

## 2021-02-22 RX ORDER — AMITRIPTYLINE HYDROCHLORIDE 25 MG/1
TABLET, FILM COATED ORAL
Qty: 60 TABLET | Refills: 0 | Status: SHIPPED | OUTPATIENT
Start: 2021-02-22

## 2021-08-13 ENCOUNTER — TELEPHONE (OUTPATIENT)
Dept: FAMILY MEDICINE CLINIC | Facility: CLINIC | Age: 61
End: 2021-08-13

## 2021-08-13 RX ORDER — FLUCONAZOLE 150 MG/1
TABLET ORAL
Qty: 2 TABLET | Refills: 0 | Status: SHIPPED | OUTPATIENT
Start: 2021-08-13

## 2021-08-13 NOTE — TELEPHONE ENCOUNTER
Caller: Drake Tony    Relationship: Self    Best call back number: [phone]    What medication are you requesting: BACTERIA INFECTIRON    What are your current symptoms: WHITE DISCHARGE    How long have you been experiencing symptoms: A WEEK   Have you had these symptoms before:    [x] Yes  [] No    Have you been treated for these symptoms before:   [x] Yes  [] No    If a prescription is needed, what is your preferred pharmacy and phone number: [facility] - [address] [phone] [facility] [phone]

## 2021-08-13 NOTE — TELEPHONE ENCOUNTER
Please call.  We will try Diflucan for yeast infection but if does not get better over the weekend, needs to go to urgent treatment center or check in the office next week.    Again, she is due for regular follow-up office visit.

## 2021-08-13 NOTE — TELEPHONE ENCOUNTER
Please call and clarify.    Is this a vaginal discharge?    Any itching?    Any recent antibiotic?    Once information obtained, I can send something in.    In addition, she is also due for follow-up office visit.

## 2022-04-28 NOTE — TELEPHONE ENCOUNTER
----- Message from Mei Royal sent at 7/14/2017 11:21 AM EDT -----  Contact: DUNCAN  PATIENT HAS A YEAST INFECTION,  ASKING IF YOU COULD CALL IN A RX FOR     DIFULICAN    [facility] [place]    [phone]   Clear

## 2024-06-07 ENCOUNTER — OFFICE VISIT (OUTPATIENT)
Dept: FAMILY MEDICINE CLINIC | Facility: CLINIC | Age: 64
End: 2024-06-07
Payer: MEDICARE

## 2024-06-07 VITALS
TEMPERATURE: 97.5 F | WEIGHT: 193 LBS | DIASTOLIC BLOOD PRESSURE: 84 MMHG | HEIGHT: 63 IN | HEART RATE: 76 BPM | RESPIRATION RATE: 18 BRPM | SYSTOLIC BLOOD PRESSURE: 130 MMHG | BODY MASS INDEX: 34.2 KG/M2

## 2024-06-07 DIAGNOSIS — R22.1 LOCALIZED SWELLING, MASS AND LUMP, NECK: Primary | ICD-10-CM

## 2024-06-07 PROCEDURE — 3075F SYST BP GE 130 - 139MM HG: CPT | Performed by: FAMILY MEDICINE

## 2024-06-07 PROCEDURE — 3079F DIAST BP 80-89 MM HG: CPT | Performed by: FAMILY MEDICINE

## 2024-06-07 PROCEDURE — 1159F MED LIST DOCD IN RCRD: CPT | Performed by: FAMILY MEDICINE

## 2024-06-07 PROCEDURE — 99203 OFFICE O/P NEW LOW 30 MIN: CPT | Performed by: FAMILY MEDICINE

## 2024-06-07 PROCEDURE — 1160F RVW MEDS BY RX/DR IN RCRD: CPT | Performed by: FAMILY MEDICINE

## 2024-06-07 RX ORDER — AMOXICILLIN AND CLAVULANATE POTASSIUM 875; 125 MG/1; MG/1
1 TABLET, FILM COATED ORAL 2 TIMES DAILY
Qty: 14 TABLET | Refills: 0 | Status: SHIPPED | OUTPATIENT
Start: 2024-06-07

## 2024-06-07 RX ORDER — RISPERIDONE 3 MG/1
TABLET ORAL
COMMUNITY
Start: 2024-06-04

## 2024-06-07 NOTE — PROGRESS NOTES
"Chief Complaint  swelling in neck     Subjective          Irris MARIA DE JESUS Tony presents to [facility]    History of Present Illness  The patient is a 63-year-old female who is here for evaluation.    The patient reports experiencing left-sided neck swelling that commenced yesterday. Although the swelling is not associated with pain, she reports a scratchy sensation in her throat. She experienced dysphagia yesterday, but has managed to eat and drink normally today. She does not recall any previous similar episodes. She has not observed any enlargement of the swelling during meals. Her respiratory function is normal, although she occasionally experiences breathing difficulties when lying in a certain position. The swelling was more severe last night, but has improved today.    Supplemental Information  The patient wonders if her blood pressure is high because she feels hot all the time. She is not on any blood pressure medication and checks her blood pressure at home.   The patient has no known allergies.        OTHER NOTES:        Review of Systems   Respiratory: Negative.     Cardiovascular: Negative.    All other systems reviewed and are negative.       Objective       Vital Signs:   /84   Pulse 76   Temp 97.5 °F (36.4 °C)   Resp 18   Ht 160 cm (63\")   Wt 87.5 kg (193 lb)   BMI 34.19 kg/m²     Physical Exam  Vitals and nursing note reviewed.   Constitutional:       General: She is not in acute distress.     Appearance: Normal appearance. She is well-developed. She is not ill-appearing.   HENT:      Head: Normocephalic and atraumatic.      Right Ear: Hearing, tympanic membrane, ear canal and external ear normal.      Left Ear: Hearing, tympanic membrane, ear canal and external ear normal.      Nose: No congestion.      Mouth/Throat:      Pharynx: No oropharyngeal exudate or posterior oropharyngeal erythema.      Comments: Patent and no swelling  Eyes:      Conjunctiva/sclera: " Conjunctivae normal.      Pupils: Pupils are equal, round, and reactive to light.   Neck:      Comments: Left neck under the left jaw, some mild swelling, not red or hot.   Cardiovascular:      Rate and Rhythm: Normal rate and regular rhythm.      Heart sounds: Normal heart sounds. No murmur heard.     No friction rub.   Pulmonary:      Effort: Pulmonary effort is normal. No respiratory distress.      Breath sounds: Normal breath sounds. No wheezing or rales.   Musculoskeletal:      Cervical back: Tenderness present.   Skin:     General: Skin is warm.   Neurological:      Mental Status: She is alert and oriented to person, place, and time.   Psychiatric:         Behavior: Behavior normal.            Physical Exam  Vital Signs  The patient's temperature is 97.5. Blood pressure is 130/84.    Result Review :            Other Results    Results               Assessment and Plan    Diagnoses and all orders for this visit:    1. Localized swelling, mass and lump, neck (Primary)  -     amoxicillin-clavulanate (AUGMENTIN) 875-125 MG per tablet; Take 1 tablet by mouth 2 (Two) Times a Day.  Dispense: 14 tablet; Refill: 0               DISCUSSION    Assessment & Plan  1. Left-sided neck swelling.  The differential diagnosis encompasses lymphadenopathy or salivary gland swelling or a salivary gland stone. The patient will be initiated on a regimen of Augmentin 875 mg, to be taken twice daily. She has been advised to monitor for any increase in swelling during meals. Should there be no improvement by next week, the patient has been instructed to inform me.     If there is no improvement, a referral to an ENT specialist for further evaluation will be considered      Otherwise, a follow-up appointment will be scheduled for her regular Medicare wellness visit in the coming months. The patient has expressed understanding of this plan. .        Follow Up   Return for Medicare Wellness exam.    Patient was given instructions and  counseling regarding her condition or for health maintenance advice. Please see specific information pulled into the AVS if appropriate.       Wade Heath MD    Patient or patient representative verbalized consent for the use of Ambient Listening during the visit with  Wade Heath MD for chart documentation. 6/7/2024  14:40 EDT

## 2024-08-21 DIAGNOSIS — R22.1 LOCALIZED SWELLING, MASS AND LUMP, NECK: ICD-10-CM

## 2024-08-21 RX ORDER — AMOXICILLIN AND CLAVULANATE POTASSIUM 875; 125 MG/1; MG/1
1 TABLET, FILM COATED ORAL 2 TIMES DAILY
Qty: 14 TABLET | Refills: 0 | OUTPATIENT
Start: 2024-08-21

## 2024-08-21 NOTE — TELEPHONE ENCOUNTER
Caller: Drake Tony MARIA DE JESUS    Relationship: Self    Best call back number:     Requested Prescriptions:   Requested Prescriptions     Pending Prescriptions Disp Refills    amoxicillin-clavulanate (AUGMENTIN) 875-125 MG per tablet 14 tablet 0     Sig: Take 1 tablet by mouth 2 (Two) Times a Day.        Pharmacy where request should be sent: [id number] - [address] [phone] [facility] [phone] FX     Last office visit with prescribing clinician: 6/7/2024   Last telemedicine visit with prescribing clinician: Visit date not found   Next office visit with prescribing clinician: Visit date not found     Additional details provided by patient: PATIENT IS OUT OF MEDICATION     Does the patient have less than a 3 day supply:  [x] Yes  [] No      Veronique Arteaga Rep   08/21/24 14:47 EDT

## 2024-08-27 ENCOUNTER — TELEPHONE (OUTPATIENT)
Dept: FAMILY MEDICINE CLINIC | Facility: CLINIC | Age: 64
End: 2024-08-27
Payer: MEDICARE

## 2024-08-27 NOTE — TELEPHONE ENCOUNTER
Pt is still having puffiness but has not cleared up completely and would like another round of antibiotic.    [facility]    [phone]

## 2024-08-28 NOTE — TELEPHONE ENCOUNTER
Please call.  That was over 2 months ago when she came in for that.  She would need to be seen again.  Can see any provider that has an opening to evaluate this.